# Patient Record
Sex: FEMALE | Race: WHITE | NOT HISPANIC OR LATINO | URBAN - METROPOLITAN AREA
[De-identification: names, ages, dates, MRNs, and addresses within clinical notes are randomized per-mention and may not be internally consistent; named-entity substitution may affect disease eponyms.]

---

## 2017-01-23 ENCOUNTER — OUTPATIENT (OUTPATIENT)
Dept: OUTPATIENT SERVICES | Facility: HOSPITAL | Age: 72
LOS: 1 days | End: 2017-01-23
Payer: MEDICARE

## 2017-01-23 DIAGNOSIS — Z00.8 ENCOUNTER FOR OTHER GENERAL EXAMINATION: ICD-10-CM

## 2017-01-23 PROCEDURE — 71020: CPT | Mod: 26

## 2017-07-20 ENCOUNTER — APPOINTMENT (OUTPATIENT)
Dept: OBGYN | Facility: CLINIC | Age: 72
End: 2017-07-20

## 2017-07-20 VITALS
DIASTOLIC BLOOD PRESSURE: 77 MMHG | HEIGHT: 64 IN | WEIGHT: 186 LBS | BODY MASS INDEX: 31.76 KG/M2 | SYSTOLIC BLOOD PRESSURE: 130 MMHG

## 2017-07-20 RX ORDER — NITROFURANTOIN (MONOHYDRATE/MACROCRYSTALS) 25; 75 MG/1; MG/1
100 CAPSULE ORAL
Refills: 0 | Status: COMPLETED | COMMUNITY

## 2017-07-24 LAB — HPV HIGH+LOW RISK DNA PNL CVX: NEGATIVE

## 2017-07-25 LAB — CYTOLOGY CVX/VAG DOC THIN PREP: NORMAL

## 2017-09-15 ENCOUNTER — NON-APPOINTMENT (OUTPATIENT)
Age: 72
End: 2017-09-15

## 2017-09-15 ENCOUNTER — APPOINTMENT (OUTPATIENT)
Dept: CARDIOLOGY | Facility: CLINIC | Age: 72
End: 2017-09-15
Payer: MEDICARE

## 2017-09-15 VITALS
BODY MASS INDEX: 31.93 KG/M2 | RESPIRATION RATE: 16 BRPM | DIASTOLIC BLOOD PRESSURE: 78 MMHG | WEIGHT: 186 LBS | OXYGEN SATURATION: 94 % | HEART RATE: 53 BPM | SYSTOLIC BLOOD PRESSURE: 141 MMHG

## 2017-09-15 DIAGNOSIS — Z83.79 FAMILY HISTORY OF OTHER DISEASES OF THE DIGESTIVE SYSTEM: ICD-10-CM

## 2017-09-15 DIAGNOSIS — E03.9 HYPOTHYROIDISM, UNSPECIFIED: ICD-10-CM

## 2017-09-15 DIAGNOSIS — Z78.9 OTHER SPECIFIED HEALTH STATUS: ICD-10-CM

## 2017-09-15 DIAGNOSIS — Z86.39 PERSONAL HISTORY OF OTHER ENDOCRINE, NUTRITIONAL AND METABOLIC DISEASE: ICD-10-CM

## 2017-09-15 DIAGNOSIS — G43.909 MIGRAINE, UNSPECIFIED, NOT INTRACTABLE, W/OUT STATUS MIGRAINOSUS: ICD-10-CM

## 2017-09-15 DIAGNOSIS — Z82.49 FAMILY HISTORY OF ISCHEMIC HEART DISEASE AND OTHER DISEASES OF THE CIRCULATORY SYSTEM: ICD-10-CM

## 2017-09-15 DIAGNOSIS — Z86.69 PERSONAL HISTORY OF OTHER DISEASES OF THE NERVOUS SYSTEM AND SENSE ORGANS: ICD-10-CM

## 2017-09-15 DIAGNOSIS — Z87.440 PERSONAL HISTORY OF URINARY (TRACT) INFECTIONS: ICD-10-CM

## 2017-09-15 PROCEDURE — 99205 OFFICE O/P NEW HI 60 MIN: CPT

## 2017-09-15 PROCEDURE — 93000 ELECTROCARDIOGRAM COMPLETE: CPT

## 2017-09-16 PROBLEM — G43.909 MIGRAINE HEADACHE: Status: ACTIVE | Noted: 2017-09-16

## 2017-09-16 PROBLEM — Z83.79 FAMILY HISTORY OF HEPATIC CIRRHOSIS: Status: ACTIVE | Noted: 2017-09-16

## 2017-09-16 PROBLEM — E03.9 HYPOTHYROIDISM: Status: ACTIVE | Noted: 2017-09-16

## 2017-09-16 PROBLEM — Z86.69 HISTORY OF MIGRAINE HEADACHES: Status: RESOLVED | Noted: 2017-09-16 | Resolved: 2017-09-16

## 2017-09-16 PROBLEM — Z78.9 DOES NOT USE ILLICIT DRUGS: Status: ACTIVE | Noted: 2017-09-16

## 2017-09-16 PROBLEM — Z82.49 FAMILY HISTORY OF ACUTE MYOCARDIAL INFARCTION: Status: ACTIVE | Noted: 2017-09-16

## 2017-09-16 PROBLEM — Z87.440 HISTORY OF FREQUENT URINARY TRACT INFECTIONS: Status: RESOLVED | Noted: 2017-09-16 | Resolved: 2017-09-16

## 2017-09-16 PROBLEM — Z86.39 HISTORY OF HYPOTHYROIDISM: Status: RESOLVED | Noted: 2017-09-16 | Resolved: 2017-09-16

## 2017-09-27 ENCOUNTER — OUTPATIENT (OUTPATIENT)
Dept: OUTPATIENT SERVICES | Facility: HOSPITAL | Age: 72
LOS: 1 days | End: 2017-09-27
Payer: MEDICARE

## 2017-09-27 ENCOUNTER — APPOINTMENT (OUTPATIENT)
Dept: CV DIAGNOSITCS | Facility: HOSPITAL | Age: 72
End: 2017-09-27

## 2017-09-27 DIAGNOSIS — R55 SYNCOPE AND COLLAPSE: ICD-10-CM

## 2017-09-27 PROCEDURE — 93227 XTRNL ECG REC<48 HR R&I: CPT

## 2017-09-27 PROCEDURE — 93306 TTE W/DOPPLER COMPLETE: CPT | Mod: 26

## 2017-11-10 ENCOUNTER — APPOINTMENT (OUTPATIENT)
Dept: CARDIOLOGY | Facility: CLINIC | Age: 72
End: 2017-11-10
Payer: MEDICARE

## 2017-11-10 ENCOUNTER — NON-APPOINTMENT (OUTPATIENT)
Age: 72
End: 2017-11-10

## 2017-11-10 VITALS
BODY MASS INDEX: 31.58 KG/M2 | HEART RATE: 51 BPM | HEIGHT: 64 IN | SYSTOLIC BLOOD PRESSURE: 119 MMHG | DIASTOLIC BLOOD PRESSURE: 86 MMHG | OXYGEN SATURATION: 97 % | WEIGHT: 185 LBS | RESPIRATION RATE: 16 BRPM

## 2017-11-10 PROCEDURE — 93000 ELECTROCARDIOGRAM COMPLETE: CPT

## 2017-11-10 PROCEDURE — 99215 OFFICE O/P EST HI 40 MIN: CPT

## 2017-11-12 RX ORDER — FOLIC ACID 1 MG/1
1 TABLET ORAL
Qty: 90 | Refills: 0 | Status: ACTIVE | COMMUNITY
Start: 2017-09-19

## 2018-05-25 ENCOUNTER — NON-APPOINTMENT (OUTPATIENT)
Age: 73
End: 2018-05-25

## 2018-05-25 ENCOUNTER — APPOINTMENT (OUTPATIENT)
Dept: CARDIOLOGY | Facility: CLINIC | Age: 73
End: 2018-05-25
Payer: MEDICARE

## 2018-05-25 VITALS
HEART RATE: 48 BPM | OXYGEN SATURATION: 97 % | DIASTOLIC BLOOD PRESSURE: 79 MMHG | BODY MASS INDEX: 31.41 KG/M2 | RESPIRATION RATE: 16 BRPM | HEIGHT: 64 IN | SYSTOLIC BLOOD PRESSURE: 113 MMHG | WEIGHT: 184 LBS | TEMPERATURE: 97.9 F

## 2018-05-25 DIAGNOSIS — Z86.79 PERSONAL HISTORY OF OTHER DISEASES OF THE CIRCULATORY SYSTEM: ICD-10-CM

## 2018-05-25 PROCEDURE — 99215 OFFICE O/P EST HI 40 MIN: CPT

## 2018-05-25 PROCEDURE — 93000 ELECTROCARDIOGRAM COMPLETE: CPT

## 2018-05-27 PROBLEM — Z86.79 HISTORY OF SINUS BRADYCARDIA: Status: RESOLVED | Noted: 2018-05-27 | Resolved: 2018-05-27

## 2018-06-05 ENCOUNTER — APPOINTMENT (OUTPATIENT)
Dept: OBGYN | Facility: CLINIC | Age: 73
End: 2018-06-05
Payer: MEDICARE

## 2018-06-05 VITALS — DIASTOLIC BLOOD PRESSURE: 92 MMHG | SYSTOLIC BLOOD PRESSURE: 147 MMHG | BODY MASS INDEX: 31.58 KG/M2 | WEIGHT: 184 LBS

## 2018-06-05 DIAGNOSIS — N64.4 MASTODYNIA: ICD-10-CM

## 2018-06-05 DIAGNOSIS — N64.9 DISORDER OF BREAST, UNSPECIFIED: ICD-10-CM

## 2018-06-05 PROCEDURE — 99213 OFFICE O/P EST LOW 20 MIN: CPT

## 2018-07-03 ENCOUNTER — OTHER (OUTPATIENT)
Age: 73
End: 2018-07-03

## 2018-07-03 ENCOUNTER — APPOINTMENT (OUTPATIENT)
Dept: SURGERY | Facility: CLINIC | Age: 73
End: 2018-07-03
Payer: MEDICARE

## 2018-07-03 VITALS
WEIGHT: 183 LBS | OXYGEN SATURATION: 96 % | SYSTOLIC BLOOD PRESSURE: 163 MMHG | TEMPERATURE: 97.8 F | DIASTOLIC BLOOD PRESSURE: 90 MMHG | HEART RATE: 63 BPM | HEIGHT: 64 IN | BODY MASS INDEX: 31.24 KG/M2

## 2018-07-03 PROCEDURE — 99204 OFFICE O/P NEW MOD 45 MIN: CPT

## 2018-07-03 RX ORDER — ESTRADIOL 0.1 MG/G
0.1 CREAM VAGINAL
Qty: 42 | Refills: 0 | Status: DISCONTINUED | COMMUNITY
Start: 2017-09-25 | End: 2018-07-03

## 2018-07-13 ENCOUNTER — APPOINTMENT (OUTPATIENT)
Dept: CARDIOLOGY | Facility: CLINIC | Age: 73
End: 2018-07-13
Payer: MEDICARE

## 2018-07-13 ENCOUNTER — NON-APPOINTMENT (OUTPATIENT)
Age: 73
End: 2018-07-13

## 2018-07-13 VITALS
WEIGHT: 178 LBS | OXYGEN SATURATION: 96 % | HEIGHT: 64 IN | TEMPERATURE: 97.8 F | HEART RATE: 94 BPM | SYSTOLIC BLOOD PRESSURE: 112 MMHG | DIASTOLIC BLOOD PRESSURE: 74 MMHG | BODY MASS INDEX: 30.39 KG/M2 | RESPIRATION RATE: 16 BRPM

## 2018-07-13 PROCEDURE — 99215 OFFICE O/P EST HI 40 MIN: CPT

## 2018-07-13 PROCEDURE — 93000 ELECTROCARDIOGRAM COMPLETE: CPT

## 2018-07-17 ENCOUNTER — OUTPATIENT (OUTPATIENT)
Dept: OUTPATIENT SERVICES | Facility: HOSPITAL | Age: 73
LOS: 1 days | End: 2018-07-17
Payer: MEDICARE

## 2018-07-17 ENCOUNTER — APPOINTMENT (OUTPATIENT)
Dept: MRI IMAGING | Facility: CLINIC | Age: 73
End: 2018-07-17
Payer: MEDICARE

## 2018-07-17 DIAGNOSIS — N64.52 NIPPLE DISCHARGE: ICD-10-CM

## 2018-07-17 PROCEDURE — C8908: CPT

## 2018-07-17 PROCEDURE — C8937: CPT

## 2018-07-17 PROCEDURE — A9585: CPT

## 2018-07-17 PROCEDURE — 82565 ASSAY OF CREATININE: CPT

## 2018-07-17 PROCEDURE — 0159T: CPT | Mod: 26

## 2018-07-17 PROCEDURE — 77059 MRI BREAST BILATERAL: CPT | Mod: 26

## 2018-07-24 ENCOUNTER — APPOINTMENT (OUTPATIENT)
Dept: OBGYN | Facility: CLINIC | Age: 73
End: 2018-07-24
Payer: MEDICARE

## 2018-07-24 VITALS — WEIGHT: 182 LBS | DIASTOLIC BLOOD PRESSURE: 100 MMHG | BODY MASS INDEX: 31.24 KG/M2 | SYSTOLIC BLOOD PRESSURE: 150 MMHG

## 2018-07-24 PROCEDURE — 99397 PER PM REEVAL EST PAT 65+ YR: CPT

## 2018-07-24 PROCEDURE — 82270 OCCULT BLOOD FECES: CPT

## 2018-07-26 LAB — HPV HIGH+LOW RISK DNA PNL CVX: NOT DETECTED

## 2018-07-30 LAB — CYTOLOGY CVX/VAG DOC THIN PREP: NORMAL

## 2018-08-17 ENCOUNTER — NON-APPOINTMENT (OUTPATIENT)
Age: 73
End: 2018-08-17

## 2018-08-17 ENCOUNTER — APPOINTMENT (OUTPATIENT)
Dept: CARDIOLOGY | Facility: CLINIC | Age: 73
End: 2018-08-17
Payer: MEDICARE

## 2018-08-17 VITALS
RESPIRATION RATE: 16 BRPM | BODY MASS INDEX: 30.9 KG/M2 | OXYGEN SATURATION: 97 % | HEIGHT: 64 IN | WEIGHT: 181 LBS | TEMPERATURE: 97.8 F | HEART RATE: 52 BPM | DIASTOLIC BLOOD PRESSURE: 75 MMHG | SYSTOLIC BLOOD PRESSURE: 113 MMHG

## 2018-08-17 PROCEDURE — 93000 ELECTROCARDIOGRAM COMPLETE: CPT

## 2018-08-17 PROCEDURE — 99215 OFFICE O/P EST HI 40 MIN: CPT

## 2018-09-18 ENCOUNTER — RX RENEWAL (OUTPATIENT)
Age: 73
End: 2018-09-18

## 2018-10-15 ENCOUNTER — APPOINTMENT (OUTPATIENT)
Dept: SURGERY | Facility: CLINIC | Age: 73
End: 2018-10-15
Payer: MEDICARE

## 2018-10-15 VITALS
TEMPERATURE: 98.3 F | SYSTOLIC BLOOD PRESSURE: 152 MMHG | OXYGEN SATURATION: 95 % | HEIGHT: 64 IN | BODY MASS INDEX: 31.41 KG/M2 | WEIGHT: 184 LBS | DIASTOLIC BLOOD PRESSURE: 84 MMHG | HEART RATE: 52 BPM

## 2018-10-15 DIAGNOSIS — Z00.00 ENCOUNTER FOR GENERAL ADULT MEDICAL EXAMINATION W/OUT ABNORMAL FINDINGS: ICD-10-CM

## 2018-10-15 PROCEDURE — 99214 OFFICE O/P EST MOD 30 MIN: CPT

## 2018-11-16 ENCOUNTER — OUTPATIENT (OUTPATIENT)
Dept: OUTPATIENT SERVICES | Facility: HOSPITAL | Age: 73
LOS: 1 days | End: 2018-11-16

## 2018-11-16 ENCOUNTER — APPOINTMENT (OUTPATIENT)
Dept: CV DIAGNOSITCS | Facility: HOSPITAL | Age: 73
End: 2018-11-16
Payer: MEDICARE

## 2018-11-16 ENCOUNTER — NON-APPOINTMENT (OUTPATIENT)
Age: 73
End: 2018-11-16

## 2018-11-16 ENCOUNTER — APPOINTMENT (OUTPATIENT)
Dept: CARDIOLOGY | Facility: CLINIC | Age: 73
End: 2018-11-16
Payer: MEDICARE

## 2018-11-16 VITALS
HEART RATE: 52 BPM | WEIGHT: 182 LBS | SYSTOLIC BLOOD PRESSURE: 108 MMHG | HEIGHT: 64 IN | OXYGEN SATURATION: 96 % | BODY MASS INDEX: 31.07 KG/M2 | DIASTOLIC BLOOD PRESSURE: 74 MMHG

## 2018-11-16 DIAGNOSIS — I48.91 UNSPECIFIED ATRIAL FIBRILLATION: ICD-10-CM

## 2018-11-16 PROCEDURE — 93000 ELECTROCARDIOGRAM COMPLETE: CPT

## 2018-11-16 PROCEDURE — 99215 OFFICE O/P EST HI 40 MIN: CPT

## 2018-11-16 PROCEDURE — 93306 TTE W/DOPPLER COMPLETE: CPT | Mod: 26

## 2018-11-23 NOTE — PHYSICAL EXAM
[General Appearance - Well Developed] : well developed [General Appearance - Well Nourished] : well nourished [Normal Conjunctiva] : the conjunctiva exhibited no abnormalities [Eyelids - No Xanthelasma] : the eyelids demonstrated no xanthelasmas [Normal Oral Mucosa] : normal oral mucosa [No Oral Cyanosis] : no oral cyanosis [Normal Jugular Venous A Waves Present] : normal jugular venous A waves present [Normal Jugular Venous V Waves Present] : normal jugular venous V waves present [] : no respiratory distress [Respiration, Rhythm And Depth] : normal respiratory rhythm and effort [Auscultation Breath Sounds / Voice Sounds] : lungs were clear to auscultation bilaterally [Heart Sounds] : normal S1 and S2 [Murmurs] : no murmurs present [Edema] : no peripheral edema present [Bowel Sounds] : normal bowel sounds [Abdomen Soft] : soft [Abdomen Tenderness] : non-tender [Abnormal Walk] : normal gait [Nail Clubbing] : no clubbing of the fingernails [Cyanosis, Localized] : no localized cyanosis [Skin Color & Pigmentation] : normal skin color and pigmentation [Oriented To Time, Place, And Person] : oriented to person, place, and time

## 2018-11-23 NOTE — HISTORY OF PRESENT ILLNESS
[FreeTextEntry1] : Ms. Gonsalez presents to the office today for a follow up visit.\par \par Ms. Gonsalez is a 73 year old female with a medical history significant for hypertension, hyperlipidemia, recently diagnosed atrial fibrillation, hypothyroidism, migraine headaches, and frequent urinary tract infections.  In addition, Ms. Gonsalez has previously undergone an appendectomy, repair of a deviated nasal septum, a right ovarian cystectomy, a pelvic sling procedure, and a tubal ligation.\par \par In November 2017, Ms. Gonsalez had experienced a brief episode of syncope while sitting in a chair.  Following that event, I had referred her for a transthoracic echocardiogram in addition to a Holter monitor.  In summary, the transthoracic echocardiogram demonstrated normal LV and RV systolic function, with no regional wall motion abnormalities.  There was mild-moderate mitral regurgitation as well as mild aortic regurgitation.  There was evidence of mild pulmonary hypertension, with an estimated pulmonary artery systolic pressure of 45 mmHg.  Ms. Gonsalez' Holter monitor demonstrated sinus rhythm with heart rates between 40/minute and 97/minute.  Rare premature ventricular complexes were noted.  In addition, there were occasional premature supraventricular complexes.  There were two very brief (3 beats) runs of SVT, with the fastest being at 138/minute.\par \par At the time of a recent office visit (May 25th, 2018), Ms. Gonsalez was found to have a resting heart rate of 42/minute on ECG.  In light of the fact that she had experienced recent episodes of lightheadedness, we had discontinued her metoprolol as this may have been contributing to the bradycardia.\par \par Recently, Ms. Gonsalez has experienced bilateral bloody discharge from both breast nipples (left more than right) in addition to bilateral breast pain.  She underwent a mammogram on June 8th, 2018.  In summary, this demonstrated fibroglandular breasts with no radiographic signs of malignancy.  A follow up breast ultrasound (also performed on June 8th, 2018) demonstrated retroareolar ductal prominence but was otherwise unremarkable.  No masses were noted.  Ms. Gonsalez had a breast MRI performed on July 17th, 2018 for further evaluation and this demonstrated no evidence of breast malignancy.\par \par At the time of a recent office visit (July 13th, 2018), Ms. Gonsalez was found to be in atrial fibrillation with a somewhat rapid ventricular response (/minute).  This correlated with her recent symptoms of palpitations and a pounding sensation in her chest.  Because of the slightly rapid ventricular response to the atrial fibrillation, Ms. Gonsalez was started back on beta blocker therapy (with metoprolol tartrate 25 mg BID).  Ms. Gonsalez declined to be started on anticoagulation therapy and she was started on aspirin instead. \par \par Ms. Gonsalez reports that she has been feeling well since her last office visit.  She has been able to ambulate without difficulty and denies having any chest pain or dyspnea either at rest or with exertion.  Ms. Gonsalez continues to experience occasional palpitations and/or a pounding sensation in her chest.  However, she estimates that these episodes occur approximately once per week or less.  The palpitations often occur while Ms. Gonsalez is lying down in bed.  There has been no history of presyncope or syncope.  In addition, Ms. Gonsalez denies having any orthopnea, paroxysmal nocturnal dyspnea, or edema.

## 2018-11-23 NOTE — DISCUSSION/SUMMARY
[FreeTextEntry1] : Of note is that Ms. Gonsalez had a follow up transthoracic echocardiogram performed at the time of today's office visit.  In summary, this demonstrated normal LV and RV systolic function, with no regional wall motion abnormalities (estimated LVEF 67%).  There was mild-to-moderate mitral regurgitation in addition to mild aortic regurgitation.  In summary, there were no significant changes compared to Ms. Gonsalez' previous study of 9/27/2017.\par \par In summary, Ms. Gonsalez appears to be doing well from a cardiac standpoint.  She continues to maintain a good functional capacity and denies having any symptoms suggestive of angina or congestive heart failure.  In addition, Ms. Caballero blood pressure is under excellent control on her current medical regimen.  Today's ECG demonstrates that Ms. Gonsalez remains in sinus rhythm.  I discussed the findings of today's echocardiogram with Ms. Gonsalez and indicated that it demonstrated normal LV and RV systolic function in addition to unchanged mitral and aortic regurgitation.\par \par I encouraged Ms. Gonsalez to continue to ambulate as tolerated.  I also advised her to continue with her current medical regimen.  We will continue Ms. Gonsalez' beta blocker therapy for now, as she appears to be asymptomatic from her resting bradycardia.  Ms. Gonsalez will follow up with me in approximately three months.

## 2018-11-30 ENCOUNTER — RX RENEWAL (OUTPATIENT)
Age: 73
End: 2018-11-30

## 2019-01-22 ENCOUNTER — RX RENEWAL (OUTPATIENT)
Age: 74
End: 2019-01-22

## 2019-03-15 ENCOUNTER — APPOINTMENT (OUTPATIENT)
Dept: CARDIOLOGY | Facility: CLINIC | Age: 74
End: 2019-03-15
Payer: MEDICARE

## 2019-03-15 ENCOUNTER — NON-APPOINTMENT (OUTPATIENT)
Age: 74
End: 2019-03-15

## 2019-03-15 VITALS
RESPIRATION RATE: 16 BRPM | BODY MASS INDEX: 30.73 KG/M2 | HEART RATE: 49 BPM | OXYGEN SATURATION: 98 % | SYSTOLIC BLOOD PRESSURE: 129 MMHG | WEIGHT: 180 LBS | HEIGHT: 64 IN | DIASTOLIC BLOOD PRESSURE: 79 MMHG

## 2019-03-15 DIAGNOSIS — R55 SYNCOPE AND COLLAPSE: ICD-10-CM

## 2019-03-15 PROCEDURE — 99214 OFFICE O/P EST MOD 30 MIN: CPT

## 2019-03-15 PROCEDURE — 93000 ELECTROCARDIOGRAM COMPLETE: CPT

## 2019-03-17 PROBLEM — R55 SYNCOPE: Status: ACTIVE | Noted: 2017-09-15

## 2019-03-17 NOTE — DISCUSSION/SUMMARY
[FreeTextEntry1] : In summary, Ms. Gonsalez appears to be doing well from a cardiac standpoint.  She continues to maintain a good functional capacity and has not had any symptoms suggestive of angina or congestive heart failure.  In addition, her blood pressure is under excellent control on her current medical regimen.  Ms. Gonsalez remains in sinus rhythm on today's ECG with no clinically obvious recent recurrences of atrial fibrillation.\par \par I encouraged Ms. Gonsalez to continue to exercise as tolerated.  I also advised her to continue with her current medical regimen.  She will follow up with me in approximately six months, at which time we will obtain a follow up transthoracic echocardiogram.

## 2019-03-17 NOTE — HISTORY OF PRESENT ILLNESS
[FreeTextEntry1] : Ms. Gonsalez presents to the office today for a follow up visit.\par \par Ms. Gonsalez is a 73 year old female with a medical history significant for hypertension, hyperlipidemia, recently diagnosed atrial fibrillation, hypothyroidism, migraine headaches, and frequent urinary tract infections.  In addition, Ms. Gonsalez has previously undergone an appendectomy, repair of a deviated nasal septum, a right ovarian cystectomy, a pelvic sling procedure, and a tubal ligation.\par \par In November 2017, Ms. Gonsalez had experienced a brief episode of syncope while sitting in a chair.  Following that event, I had referred her for a transthoracic echocardiogram in addition to a Holter monitor.  In summary, the transthoracic echocardiogram demonstrated normal LV and RV systolic function, with no regional wall motion abnormalities.  There was mild-moderate mitral regurgitation as well as mild aortic regurgitation.  There was evidence of mild pulmonary hypertension, with an estimated pulmonary artery systolic pressure of 45 mmHg.  Ms. Gonsalez' Holter monitor demonstrated sinus rhythm with heart rates between 40/minute and 97/minute.  Rare premature ventricular complexes were noted.  In addition, there were occasional premature supraventricular complexes.  There were two very brief (3 beats) runs of SVT, with the fastest being at 138/minute.\par \par At the time of a recent office visit (May 25th, 2018), Ms. Gonsalez was found to have a resting heart rate of 42/minute on ECG.  In light of the fact that she had experienced recent episodes of lightheadedness, we had discontinued her metoprolol as this may have been contributing to the bradycardia.\par \par Recently, Ms. Gonsalez has experienced bilateral bloody discharge from both breast nipples (left more than right) in addition to bilateral breast pain.  She underwent a mammogram on June 8th, 2018.  In summary, this demonstrated fibroglandular breasts with no radiographic signs of malignancy.  A follow up breast ultrasound (also performed on June 8th, 2018) demonstrated retroareolar ductal prominence but was otherwise unremarkable.  No masses were noted.  Ms. Gonsalez had a breast MRI performed on July 17th, 2018 for further evaluation and this demonstrated no evidence of breast malignancy.\par \par At the time of a recent office visit (July 13th, 2018), Ms. Gonsalez was found to be in atrial fibrillation with a somewhat rapid ventricular response (/minute).  This correlated with her recent symptoms of palpitations and a pounding sensation in her chest.  Because of the slightly rapid ventricular response to the atrial fibrillation, Ms. Gonsalez was started back on beta blocker therapy (with metoprolol tartrate 25 mg BID).  Ms. Gonsalez declined to be started on anticoagulation therapy and she was started on aspirin instead. \par \par Ms. Gonsalez reports that she has been feeling well since her last office visit.  She continues to ambulate without difficulty and denies having any chest pain or dyspnea either at rest or with exertion.  In addition, there has been no history of palpitations, presyncope, or syncope.  Ms. Gonsalez denies having any orthopnea, paroxysmal nocturnal dyspnea, or edema.  Ms. Gonsalez has been measuring her blood pressure at home and maintains a  blood pressure diary.  In summary, the systolic blood pressure range has been in the 120's-130's mmHg and the diastolic blood pressure range in the 60's-70's mmHg.

## 2019-03-17 NOTE — PHYSICAL EXAM
[General Appearance - Well Nourished] : well nourished [General Appearance - Well Developed] : well developed [Eyelids - No Xanthelasma] : the eyelids demonstrated no xanthelasmas [Normal Conjunctiva] : the conjunctiva exhibited no abnormalities [No Oral Cyanosis] : no oral cyanosis [Normal Oral Mucosa] : normal oral mucosa [Normal Jugular Venous A Waves Present] : normal jugular venous A waves present [Normal Jugular Venous V Waves Present] : normal jugular venous V waves present [] : no respiratory distress [Respiration, Rhythm And Depth] : normal respiratory rhythm and effort [Auscultation Breath Sounds / Voice Sounds] : lungs were clear to auscultation bilaterally [Murmurs] : no murmurs present [Heart Sounds] : normal S1 and S2 [Bowel Sounds] : normal bowel sounds [Edema] : no peripheral edema present [Abdomen Soft] : soft [Abdomen Tenderness] : non-tender [Abnormal Walk] : normal gait [Nail Clubbing] : no clubbing of the fingernails [Skin Color & Pigmentation] : normal skin color and pigmentation [Cyanosis, Localized] : no localized cyanosis [Oriented To Time, Place, And Person] : oriented to person, place, and time

## 2019-04-01 ENCOUNTER — RX RENEWAL (OUTPATIENT)
Age: 74
End: 2019-04-01

## 2019-04-15 ENCOUNTER — APPOINTMENT (OUTPATIENT)
Dept: BREAST CENTER | Facility: CLINIC | Age: 74
End: 2019-04-15
Payer: MEDICARE

## 2019-04-15 ENCOUNTER — APPOINTMENT (OUTPATIENT)
Dept: SURGERY | Facility: CLINIC | Age: 74
End: 2019-04-15

## 2019-04-15 VITALS
HEIGHT: 64 IN | WEIGHT: 185.31 LBS | DIASTOLIC BLOOD PRESSURE: 77 MMHG | HEART RATE: 51 BPM | SYSTOLIC BLOOD PRESSURE: 126 MMHG | BODY MASS INDEX: 31.64 KG/M2

## 2019-04-15 PROCEDURE — 99214 OFFICE O/P EST MOD 30 MIN: CPT

## 2019-04-15 NOTE — PAST MEDICAL HISTORY
[Postmenopausal] : The patient is postmenopausal [Menopause Age____] : age at menopause was [unfilled] [Menarche Age ____] : age at menarche was [unfilled] [Total Preg ___] : G[unfilled] [Live Births ___] : P[unfilled]  [Age At Live Birth ___] : Age at live birth: [unfilled]

## 2019-04-15 NOTE — ASSESSMENT
[FreeTextEntry1] : 73 yo female presents for clinical evaluation secondary to history of spontaneous bloody nipple discharge (patient was on Estrace cream for chronic UTI , began taking 9/25/2017 until 5/2018).  Recent imaging and clinical breast exam is benign.  Recommendation for follow up visit in 6 months for clinical breast exam and continue annual screening mammogram and sonogram.\par 1.  Continue annual screening mammogram and sonogram due 6/2019\par 2. Follow up exam in 6 months 10/2019 for clinical breast exam

## 2019-04-15 NOTE — HISTORY OF PRESENT ILLNESS
[FreeTextEntry1] : Referring Physician:  Dr. Ebony You\par I had the pleasure of seeing PARRIS HUTSON  in the office today for a follow up breast exam s/p history with bloody nipple discharge.\par \par Parris is a dominik 73 yo female who is in overall good health.  She states she was put on Estrace cream for her chronic urinary tract infection in 2017.  She developed spontaneous bloody nipple discharge in May of 2018 and saw her urologist who then discontinued the Estrace cream and she has not had any problems since.  She underwent diagnostic mammogram/sonogram which both came back benign.  She is here today for clinical evaluation.\par \par She denies dominant breast mass, skin changes or nipple discharge. She denies headaches, blurry vision, chest pain, SOB, abdominal pain, joint aches or difficult walking.\par \par  with 1st delivery at 23.  Menses began at age 12.\par She denies personal history of malignancy.\par She denies family history.\par \par Imaging:  [facility]\par 2018:  BILATERAL DIAGNOSTIC MAMMOGRAM:  Minimally fibroglandular breast with no radiographic signs of malignancy,  When compared with prior mammographic examination, there is no interval change.  In view of the palpable abnormality on the left breast ultrasound is scheduled for additional evaluation.  BIRADS 0 Incomplete\par \par 2018  BILATERAL DIAGNOSTIC BREAST ULTRASOUND:  Mild retroareolar ductal prominence.  Otherwise unremarkable bilateral breast sonogram.  No masses noted.  If a discrete mass is palpable, further evaluation must be made based on clinical grounds alone.  BIRADS 2 Benign findings.\par \par 2018  MR BREAST WAWIC BI W CAD:\par Impression:  No MRI evidence of malignancy.  BIRADS 2 Benign findings.\par \par We reviewed clinical exam.  Recent imaging is benign.   We had discussed MRI results via mobile in 2018.  She reports she has not had any nipple bloody discharge since she stopped taking estrace cream.  Her clinical breast exam is benign and she inverted nipples that nataliya when stimulated.  Recommendation to continue annual screening mammogram and sonogram in 2019 and follow up clinical breast exam in 2019.  She understands and agrees to plan.  All questions answered.

## 2019-04-15 NOTE — PHYSICAL EXAM
[Normocephalic] : normocephalic [Atraumatic] : atraumatic [EOMI] : extra ocular movement intact [PERRL] : pupils equal, round and reactive to light [Sclera nonicteric] : sclera nonicteric [Supple] : supple [No Supraclavicular Adenopathy] : no supraclavicular adenopathy [Examined in the supine and seated position] : examined in the supine and seated position [No dominant masses] : no dominant masses in right breast  [No dominant masses] : no dominant masses left breast [No Nipple Discharge] : no right nipple discharge [No Nipple Retraction] : no left nipple retraction [Breast Abnormal Secretion Bloody Fluid Right] : no bloody discharge [Breast Nipple Inversion] : nipples inverted [Breast Abnormal Secretion Bloody Fluid Left] : no bloody discharge [Breast Nipple Retraction] : nipples not retracted [Breast Nipple Flattening] : nipples not flattened [Breast Nipple Fissures] : nipples not fissured [Breast Abnormal Secretion Serous Fluid] : no serous discharge [Breast Abnormal Lactation (Galactorrhea)] : no galactorrhea [Breast Abnormal Secretion Opalescent Fluid] : no milky discharge [No Axillary Lymphadenopathy] : no left axillary lymphadenopathy [No Rashes] : no rashes [No Edema] : no edema [de-identified] : No supraclavicular or axillary adenopathy. No dominant masses, normal to palpation. Inverted nipple without discharge. No skin changes.\par  [No Ulceration] : no ulceration [de-identified] : No supraclavicular or axillary adenopathy. No dominant masses, normal to palpation. inverted nipple. No skin changes.\par

## 2019-05-12 ENCOUNTER — RX RENEWAL (OUTPATIENT)
Age: 74
End: 2019-05-12

## 2019-07-30 ENCOUNTER — RX RENEWAL (OUTPATIENT)
Age: 74
End: 2019-07-30

## 2019-09-16 ENCOUNTER — RX RENEWAL (OUTPATIENT)
Age: 74
End: 2019-09-16

## 2019-10-14 ENCOUNTER — APPOINTMENT (OUTPATIENT)
Dept: SURGERY | Facility: CLINIC | Age: 74
End: 2019-10-14

## 2019-10-18 ENCOUNTER — NON-APPOINTMENT (OUTPATIENT)
Age: 74
End: 2019-10-18

## 2019-10-18 ENCOUNTER — APPOINTMENT (OUTPATIENT)
Dept: CARDIOLOGY | Facility: CLINIC | Age: 74
End: 2019-10-18
Payer: MEDICARE

## 2019-10-18 VITALS
DIASTOLIC BLOOD PRESSURE: 71 MMHG | WEIGHT: 192 LBS | SYSTOLIC BLOOD PRESSURE: 106 MMHG | HEART RATE: 71 BPM | BODY MASS INDEX: 32.78 KG/M2 | HEIGHT: 64 IN | OXYGEN SATURATION: 96 %

## 2019-10-18 PROCEDURE — 99214 OFFICE O/P EST MOD 30 MIN: CPT

## 2019-10-18 PROCEDURE — 93000 ELECTROCARDIOGRAM COMPLETE: CPT

## 2019-10-20 NOTE — DISCUSSION/SUMMARY
[FreeTextEntry1] : In summary, today's 12-lead electrocardiogram demonstrates that Ms. Gonsalez has returned to being in atrial fibrillation.  It appears that she is minimally symptomatic from the arrhythmia and is not aware of when she may have returned to being in atrial fibrillation.  The ventricular rate control appears to be adequate on her current dose of metoprolol.\par \par We again had a discussion regarding the concept of being on anticoagulation therapy for thromboembolic prophylaxis in the setting of atrial fibrillation.  In particular, Ms. Gonsalez has a [id number] score of 3 (one point each for hypertension, age 65-74 years, and female gender).  As such, it would be advisable for Ms. Gonsalez to be treated with therapeutic anticoagulation for thromboembolic prophylaxis.  However, Ms. Gonsalez continues to decline anticoagulation therapy despite a discussion regarding the risk of stroke.  Therefore, Ms. Gonsalez will continue taking aspirin and will decline oral anticoagulation therapy.\par \par Ms. Gonsalez will follow up with me in approximately three months, at which time we will obtain a follow up transthoracic echocardiogram, as it will have been more than a year since her last study.

## 2019-10-20 NOTE — PHYSICAL EXAM
[Eyelids - No Xanthelasma] : the eyelids demonstrated no xanthelasmas [General Appearance - Well Developed] : well developed [Normal Conjunctiva] : the conjunctiva exhibited no abnormalities [General Appearance - Well Nourished] : well nourished [Normal Jugular Venous A Waves Present] : normal jugular venous A waves present [Normal Oral Mucosa] : normal oral mucosa [No Oral Cyanosis] : no oral cyanosis [Normal Jugular Venous V Waves Present] : normal jugular venous V waves present [] : no respiratory distress [Respiration, Rhythm And Depth] : normal respiratory rhythm and effort [Heart Sounds] : normal S1 and S2 [Auscultation Breath Sounds / Voice Sounds] : lungs were clear to auscultation bilaterally [Murmurs] : no murmurs present [Bowel Sounds] : normal bowel sounds [Edema] : no peripheral edema present [Abdomen Soft] : soft [Abdomen Tenderness] : non-tender [Nail Clubbing] : no clubbing of the fingernails [Abnormal Walk] : normal gait [Skin Color & Pigmentation] : normal skin color and pigmentation [Oriented To Time, Place, And Person] : oriented to person, place, and time [Cyanosis, Localized] : no localized cyanosis

## 2019-10-20 NOTE — HISTORY OF PRESENT ILLNESS
[FreeTextEntry1] : Ms. Gonsalez presents to the office today for a follow up visit.\par \par Ms. Gonsalez is a 73 year old female with a medical history significant for hypertension, hyperlipidemia, recently diagnosed atrial fibrillation, hypothyroidism, migraine headaches, and frequent urinary tract infections.  In addition, Ms. Gonsalez has previously undergone an appendectomy, repair of a deviated nasal septum, a right ovarian cystectomy, a pelvic sling procedure, and a tubal ligation.\par \par In November 2017, Ms. Gonsalez had experienced a brief episode of syncope while sitting in a chair.  Following that event, I had referred her for a transthoracic echocardiogram in addition to a Holter monitor.  In summary, the transthoracic echocardiogram demonstrated normal LV and RV systolic function, with no regional wall motion abnormalities.  There was mild-moderate mitral regurgitation as well as mild aortic regurgitation.  There was evidence of mild pulmonary hypertension, with an estimated pulmonary artery systolic pressure of 45 mmHg.  Ms. Gonsalez' Holter monitor demonstrated sinus rhythm with heart rates between 40/minute and 97/minute.  Rare premature ventricular complexes were noted.  In addition, there were occasional premature supraventricular complexes.  There were two very brief (3 beats) runs of SVT, with the fastest being at 138/minute.\par \par At the time of a previous office visit (May 25th, 2018), Ms. Gonsalez was found to have a resting heart rate of 42/minute on ECG.  In light of the fact that she had experienced recent episodes of lightheadedness, we had discontinued her metoprolol as this may have been contributing to the bradycardia.\par \par Recently, Ms. Gonsalez has experienced bilateral bloody discharge from both breast nipples (left more than right) in addition to bilateral breast pain.  She underwent a mammogram on June 8th, 2018.  In summary, this demonstrated fibroglandular breasts with no radiographic signs of malignancy.  A follow up breast ultrasound (also performed on June 8th, 2018) demonstrated retroareolar ductal prominence but was otherwise unremarkable.  No masses were noted.  Ms. Gonsalez had a breast MRI performed on July 17th, 2018 for further evaluation and this demonstrated no evidence of breast malignancy.\par \par At the time of a recent office visit (July 13th, 2018), Ms. Gonsalez was found to be in atrial fibrillation with a somewhat rapid ventricular response (/minute).  This correlated with her recent symptoms of palpitations and a pounding sensation in her chest.  Because of the slightly rapid ventricular response to the atrial fibrillation, Ms. Gonsalez was started back on beta blocker therapy (with metoprolol tartrate 25 mg BID).  Ms. Gonsalez declined to be started on anticoagulation therapy and she was started on aspirin instead.  However, subsequent electrocardiograms have demonstrated sinus rhythm.\par \par Ms. Gonsalez reports that she has been feeling well since her last office visit.  She describes having had several episodes of dizziness over the course of one week, which occurred a few months ago.  Otherwise she has been able to ambulate without difficulty and denies having any chest pain or dyspnea either at rest or with exertion.  In addition, Ms. Gonsalez denies having any palpitations, presyncope, or syncope.  There has been no history of orthopnea, paroxysmal nocturnal dyspnea, or edema.

## 2019-11-29 ENCOUNTER — RX RENEWAL (OUTPATIENT)
Age: 74
End: 2019-11-29

## 2019-12-19 ENCOUNTER — APPOINTMENT (OUTPATIENT)
Dept: BREAST CENTER | Facility: CLINIC | Age: 74
End: 2019-12-19
Payer: MEDICARE

## 2019-12-19 VITALS
SYSTOLIC BLOOD PRESSURE: 120 MMHG | HEART RATE: 71 BPM | HEIGHT: 64 IN | OXYGEN SATURATION: 96 % | BODY MASS INDEX: 32.44 KG/M2 | WEIGHT: 190 LBS | TEMPERATURE: 97.6 F | DIASTOLIC BLOOD PRESSURE: 73 MMHG

## 2019-12-19 PROCEDURE — 99215 OFFICE O/P EST HI 40 MIN: CPT

## 2019-12-19 NOTE — ASSESSMENT
[FreeTextEntry1] : 73 yo female presents for clinical evaluation secondary to history of spontaneous bloody nipple discharge (patient was on Estrace cream for chronic UTI , began taking 9/25/2017 until 5/2018).  Recent imaging and clinical breast exam is benign.  Recommendation for follow up visit in 6 months for clinical breast exam and continue annual screening mammogram and sonogram.\par 1.  Continue annual screening mammogram and sonogram due 6/2020\par 2. Follow up exam in 6 months 6/2020 for clinical breast exam

## 2019-12-19 NOTE — PHYSICAL EXAM
[Normocephalic] : normocephalic [EOMI] : extra ocular movement intact [PERRL] : pupils equal, round and reactive to light [Atraumatic] : atraumatic [Supple] : supple [Sclera nonicteric] : sclera nonicteric [No Supraclavicular Adenopathy] : no supraclavicular adenopathy [No dominant masses] : no dominant masses in right breast  [Examined in the supine and seated position] : examined in the supine and seated position [No dominant masses] : no dominant masses left breast [No Nipple Retraction] : no right nipple retraction [Breast Abnormal Secretion Bloody Fluid Right] : no bloody discharge [No Nipple Discharge] : no right nipple discharge [Breast Abnormal Secretion Bloody Fluid Left] : no bloody discharge [Breast Nipple Retraction] : nipples not retracted [Breast Nipple Inversion] : nipples inverted [Breast Abnormal Lactation (Galactorrhea)] : no galactorrhea [Breast Nipple Fissures] : nipples not fissured [Breast Nipple Flattening] : nipples not flattened [Breast Abnormal Secretion Serous Fluid] : no serous discharge [Breast Abnormal Secretion Opalescent Fluid] : no milky discharge [No Axillary Lymphadenopathy] : no left axillary lymphadenopathy [No Edema] : no edema [No Ulceration] : no ulceration [No Rashes] : no rashes [de-identified] : No supraclavicular or axillary adenopathy. No dominant masses, normal to palpation. inverted nipple. No skin changes.\par  [de-identified] : No supraclavicular or axillary adenopathy. No dominant masses, normal to palpation. Inverted nipple without discharge. No skin changes.\par

## 2019-12-19 NOTE — PAST MEDICAL HISTORY
[Menopause Age____] : age at menopause was [unfilled] [Postmenopausal] : The patient is postmenopausal [Menarche Age ____] : age at menarche was [unfilled] [Live Births ___] : P[unfilled]  [Total Preg ___] : G[unfilled] [Age At Live Birth ___] : Age at live birth: [unfilled]

## 2019-12-19 NOTE — HISTORY OF PRESENT ILLNESS
[FreeTextEntry1] : Referring Physician:  Dr. Ebony You\par I had the pleasure of seeing PARRIS HUTSON  in the office today for a follow up breast exam s/p history with bloody nipple discharge.\par \par Parris is a dominik 73 yo female who is in overall good health.  She states she was put on Estrace cream for her chronic urinary tract infection in 2017.  She developed spontaneous bloody nipple discharge in May of 2018 and saw her urologist who then discontinued the Estrace cream and she has not had any problems since.  She underwent diagnostic mammogram/sonogram which both came back benign.  She is here today for clinical evaluation.\par \par She denies dominant breast mass, skin changes or nipple discharge. She denies headaches, blurry vision, chest pain, SOB, abdominal pain, joint aches or difficult walking.\par \par  with 1st delivery at 23.  Menses began at age 12.\par She denies personal history of malignancy.\par She denies family history.\par \par Imaging:  [facility]\par 2019:  BILATERAL DIAGNOSTIC MAMMOGRAM/ BILATERAL DIAGNOSTIC BREAST ULTRASOUND:  No mammographic or sonographic evidence of malignancy.  BIRADS 2 Benign findings.\par \par 2018  MR BREAST WAWIC BI W CAD:\par Impression:  No MRI evidence of malignancy.  BIRADS 2 Benign findings.\par \par We reviewed clinical exam and recent imaging.  Both clinical exam and recent imaging is benign today.  Recommendation to continue annual screening mammogram and sonogram in 2019 and follow up clinical breast exam in  after imaging.  She understands and agrees to plan.  All questions answered.

## 2020-01-10 ENCOUNTER — NON-APPOINTMENT (OUTPATIENT)
Age: 75
End: 2020-01-10

## 2020-01-10 ENCOUNTER — APPOINTMENT (OUTPATIENT)
Dept: CV DIAGNOSITCS | Facility: HOSPITAL | Age: 75
End: 2020-01-10

## 2020-01-10 ENCOUNTER — APPOINTMENT (OUTPATIENT)
Dept: CARDIOLOGY | Facility: CLINIC | Age: 75
End: 2020-01-10
Payer: MEDICARE

## 2020-01-10 ENCOUNTER — OUTPATIENT (OUTPATIENT)
Dept: OUTPATIENT SERVICES | Facility: HOSPITAL | Age: 75
LOS: 1 days | End: 2020-01-10

## 2020-01-10 VITALS
HEIGHT: 64 IN | WEIGHT: 189 LBS | OXYGEN SATURATION: 97 % | BODY MASS INDEX: 32.27 KG/M2 | HEART RATE: 76 BPM | DIASTOLIC BLOOD PRESSURE: 76 MMHG | SYSTOLIC BLOOD PRESSURE: 112 MMHG

## 2020-01-10 DIAGNOSIS — I48.91 UNSPECIFIED ATRIAL FIBRILLATION: ICD-10-CM

## 2020-01-10 PROCEDURE — 99214 OFFICE O/P EST MOD 30 MIN: CPT

## 2020-01-10 PROCEDURE — 93000 ELECTROCARDIOGRAM COMPLETE: CPT

## 2020-01-12 ENCOUNTER — RX RENEWAL (OUTPATIENT)
Age: 75
End: 2020-01-12

## 2020-01-15 NOTE — DISCUSSION/SUMMARY
[FreeTextEntry1] : Of note is that Ms. Gonsalez had a follow up transthoracic echocardiogram performed at the time of today's office visit.  In summary, this demonstrated normal LV and RV systolic function, with no regional wall motion abnormalities (LVEF 60%).  There was mild to moderate mitral regurgitation and mild aortic regurgitation.  In summary, there were no significant changes compared to Ms. Gonsalez' previous study of 11/16/2018.\par \par In summary, today's 12-lead electrocardiogram demonstrates that Ms. Gonsalez remains in atrial fibrillation.  However, she appears to be minimally symptomatic from the arrhythmia at this time.  In addition, the atrial fibrillation appears well rate controlled on her current medical regimen.  We again had a discussion regarding the use of thromboembolic prophylaxis in the setting of atrial fibrillation, which may be chronic.  I reiterated my recommendation to Ms. Gonsalez that she be treated with anticoagulation therapy for thromboembolic prophylaxis, as her [id number] score is elevated at 4 (two points for age 74 years and one point each for hypertension and female gender).  However, Ms. Gonsalez continues to decline taking anticoagulation therapy despite the increased risk of stroke or other thromboembolic complications.  Of note is that Ms. Gonsalez is not a candidate for a RICH-guided cardioversion out of atrial fibrillation as she is refusing to be on anticoagulation therapy.  She will therefore continue taking aspirin.  Ms. Gonsalez will follow up with me in approximately six months.

## 2020-01-15 NOTE — HISTORY OF PRESENT ILLNESS
[FreeTextEntry1] : Ms. Gonsalez presents to the office today for a follow up visit.\par \par Ms. Gonsalez is a 74 year old female with a medical history significant for hypertension, hyperlipidemia, recently diagnosed atrial fibrillation, hypothyroidism, migraine headaches, and frequent urinary tract infections.  In addition, Ms. Gonsalez has previously undergone an appendectomy, repair of a deviated nasal septum, a right ovarian cystectomy, a pelvic sling procedure, and a tubal ligation.\par \par In November 2017, Ms. Gonsalez had experienced a brief episode of syncope while sitting in a chair.  Following that event, I had referred her for a transthoracic echocardiogram in addition to a Holter monitor.  In summary, the transthoracic echocardiogram demonstrated normal LV and RV systolic function, with no regional wall motion abnormalities.  There was mild-moderate mitral regurgitation as well as mild aortic regurgitation.  There was evidence of mild pulmonary hypertension, with an estimated pulmonary artery systolic pressure of 45 mmHg.  Ms. Gonsalez' Holter monitor demonstrated sinus rhythm with heart rates between 40/minute and 97/minute.  Rare premature ventricular complexes were noted.  In addition, there were occasional premature supraventricular complexes.  There were two very brief (3 beats) runs of SVT, with the fastest being at 138/minute.\par \par At the time of a previous office visit (May 25th, 2018), Ms. Gonsalez was found to have a resting heart rate of 42/minute on ECG.  In light of the fact that she had experienced recent episodes of lightheadedness, we had discontinued her metoprolol as this may have been contributing to the bradycardia.\par \par In spring 2018, Ms. Gonsalez has experienced bilateral bloody discharge from both breast nipples (left more than right) in addition to bilateral breast pain.  She underwent a mammogram on June 8th, 2018.  In summary, this demonstrated fibroglandular breasts with no radiographic signs of malignancy.  A follow up breast ultrasound (also performed on June 8th, 2018) demonstrated retroareolar ductal prominence but was otherwise unremarkable.  No masses were noted.  Ms. Gonsalez had a breast MRI performed on July 17th, 2018 for further evaluation and this demonstrated no evidence of breast malignancy.\par \par At the time of a previous office visit (July 13th, 2018), Ms. Gonsalez was found to be in atrial fibrillation with a somewhat rapid ventricular response (/minute).  This correlated with her recent symptoms of palpitations and a pounding sensation in her chest.  Because of the slightly rapid ventricular response to the atrial fibrillation, Ms. Gonsalez was started back on beta blocker therapy (with metoprolol tartrate 25 mg BID).  Ms. Gonsalez declined to be started on anticoagulation therapy and she was started on aspirin instead.  However, subsequent electrocardiograms have demonstrated sinus rhythm.\par \par Ms. Gonsalez reports that she has been feeling reasonably well since her last office visit.  She has been able to ambulate without difficulty and denies having any chest pain or dyspnea either at rest or with exertion.  In addition, there has been no history of palpitations, presyncope, or syncope.  Ms. Gonsalez denies having any orthopnea, paroxysmal nocturnal dyspnea, or edema.

## 2020-01-15 NOTE — PHYSICAL EXAM
[General Appearance - Well Developed] : well developed [Normal Conjunctiva] : the conjunctiva exhibited no abnormalities [General Appearance - Well Nourished] : well nourished [Eyelids - No Xanthelasma] : the eyelids demonstrated no xanthelasmas [Normal Oral Mucosa] : normal oral mucosa [No Oral Cyanosis] : no oral cyanosis [Normal Jugular Venous A Waves Present] : normal jugular venous A waves present [Normal Jugular Venous V Waves Present] : normal jugular venous V waves present [] : no respiratory distress [Respiration, Rhythm And Depth] : normal respiratory rhythm and effort [Auscultation Breath Sounds / Voice Sounds] : lungs were clear to auscultation bilaterally [Heart Sounds] : normal S1 and S2 [Murmurs] : no murmurs present [Edema] : no peripheral edema present [Bowel Sounds] : normal bowel sounds [Abdomen Tenderness] : non-tender [Abdomen Soft] : soft [Abnormal Walk] : normal gait [Nail Clubbing] : no clubbing of the fingernails [Cyanosis, Localized] : no localized cyanosis [Skin Color & Pigmentation] : normal skin color and pigmentation [Oriented To Time, Place, And Person] : oriented to person, place, and time

## 2020-06-14 ENCOUNTER — RX RENEWAL (OUTPATIENT)
Age: 75
End: 2020-06-14

## 2020-07-08 ENCOUNTER — APPOINTMENT (OUTPATIENT)
Dept: OBGYN | Facility: CLINIC | Age: 75
End: 2020-07-08
Payer: MEDICARE

## 2020-07-08 VITALS — SYSTOLIC BLOOD PRESSURE: 127 MMHG | WEIGHT: 185 LBS | BODY MASS INDEX: 31.76 KG/M2 | DIASTOLIC BLOOD PRESSURE: 80 MMHG

## 2020-07-08 PROCEDURE — 82270 OCCULT BLOOD FECES: CPT

## 2020-07-08 PROCEDURE — G0101: CPT

## 2020-07-08 NOTE — HISTORY OF PRESENT ILLNESS
[___ Year(s) Ago] : [unfilled] year(s) ago [Good] : being in good health [Healthy Diet] : a healthy diet [Regular Exercise] : regular exercise [Weight Concerns] : no concerns with her weight [Last Pap ___] : Last cervical pap smear was [unfilled] [Postmenopausal] : is postmenopausal [Currently In Menopause] : currently in menopause [Experiencing Menopausal Sxs] : not experiencing menopausal symptoms [Menopause Age: ____] : age at menopause was [unfilled]

## 2020-07-08 NOTE — PHYSICAL EXAM
[Awake] : awake [Acute Distress] : no acute distress [Alert] : alert [Mass] : no breast mass [Axillary LAD] : no axillary lymphadenopathy [Nipple Discharge] : no nipple discharge [Soft] : soft [Oriented x3] : oriented to person, place, and time [Tender] : non tender [Normal] : cervix [Uterine Adnexae] : were not tender and not enlarged [Occult Blood] : occult blood test from digital rectal exam was negative [No Bleeding] : there was no active vaginal bleeding

## 2020-07-09 LAB — HPV HIGH+LOW RISK DNA PNL CVX: NOT DETECTED

## 2020-07-13 LAB — CYTOLOGY CVX/VAG DOC THIN PREP: NORMAL

## 2020-07-17 ENCOUNTER — APPOINTMENT (OUTPATIENT)
Dept: CARDIOLOGY | Facility: CLINIC | Age: 75
End: 2020-07-17
Payer: MEDICARE

## 2020-07-17 ENCOUNTER — NON-APPOINTMENT (OUTPATIENT)
Age: 75
End: 2020-07-17

## 2020-07-17 VITALS
HEART RATE: 69 BPM | SYSTOLIC BLOOD PRESSURE: 124 MMHG | OXYGEN SATURATION: 97 % | BODY MASS INDEX: 32.27 KG/M2 | TEMPERATURE: 97 F | HEIGHT: 64 IN | DIASTOLIC BLOOD PRESSURE: 86 MMHG

## 2020-07-17 VITALS — WEIGHT: 188 LBS | BODY MASS INDEX: 32.27 KG/M2

## 2020-07-17 PROCEDURE — 93000 ELECTROCARDIOGRAM COMPLETE: CPT

## 2020-07-17 PROCEDURE — 99214 OFFICE O/P EST MOD 30 MIN: CPT

## 2020-07-20 NOTE — HISTORY OF PRESENT ILLNESS
[FreeTextEntry1] : Ms. Gonsalez presents to the office today for a follow up visit.\par \par Ms. Gonsalez is a 74 year old female with a medical history significant for hypertension, hyperlipidemia, recently diagnosed atrial fibrillation, hypothyroidism, migraine headaches, and frequent urinary tract infections.  In addition, Ms. Gonsalez has previously undergone an appendectomy, repair of a deviated nasal septum, a right ovarian cystectomy, a pelvic sling procedure, and a tubal ligation.\par \par In November 2017, Ms. Gonsalez had experienced a brief episode of syncope while sitting in a chair.  Following that event, I had referred her for a transthoracic echocardiogram in addition to a Holter monitor.  In summary, the transthoracic echocardiogram demonstrated normal LV and RV systolic function, with no regional wall motion abnormalities.  There was mild-moderate mitral regurgitation as well as mild aortic regurgitation.  There was evidence of mild pulmonary hypertension, with an estimated pulmonary artery systolic pressure of 45 mmHg.  Ms. Gonsalez' Holter monitor demonstrated sinus rhythm with heart rates between 40/minute and 97/minute.  Rare premature ventricular complexes were noted.  In addition, there were occasional premature supraventricular complexes.  There were two very brief (3 beats) runs of SVT, with the fastest being at 138/minute.\par \par At the time of a previous office visit (May 25th, 2018), Ms. Gonsalez was found to have a resting heart rate of 42/minute on ECG.  In light of the fact that she had experienced recent episodes of lightheadedness, we had discontinued her metoprolol as this may have been contributing to the bradycardia.\par \par In spring 2018, Ms. Gonsalez has experienced bilateral bloody discharge from both breast nipples (left more than right) in addition to bilateral breast pain.  She underwent a mammogram on June 8th, 2018.  In summary, this demonstrated fibroglandular breasts with no radiographic signs of malignancy.  A follow up breast ultrasound (also performed on June 8th, 2018) demonstrated retroareolar ductal prominence but was otherwise unremarkable.  No masses were noted.  Ms. Gonsalez had a breast MRI performed on July 17th, 2018 for further evaluation and this demonstrated no evidence of breast malignancy.\par \par At the time of a previous office visit (July 13th, 2018), Ms. Gonsalez was found to be in atrial fibrillation with a somewhat rapid ventricular response (/minute).  This correlated with her recent symptoms of palpitations and a pounding sensation in her chest.  Because of the slightly rapid ventricular response to the atrial fibrillation, Ms. Gonsalez was started back on beta blocker therapy (with metoprolol tartrate 25 mg BID).  Ms. Gonsalez declined to be started on anticoagulation therapy and she was started on aspirin instead.   \par \par Ms. Gonsalez reports that she has been experiencing occasional episodes of chest pressure.  These have occurred mostly while she is at rest and not as frequently with physical exertion.  The chest pressure radiates into the neck but not down the arms and there is no associated nausea, dyspnea, or diaphoresis.  Ms. Gonsalez reports that the chest pressure improves with drinking water.  The chest pressure lasts for several minutes at a time before resolving spontaneously or with drinking water.  Ms. Gonsalez denies having any palpitations, presyncope, or syncope.  There has been no history of orthopnea, paroxysmal nocturnal dyspnea, or edema.\par \par Ms. Gonsalez most recent blood work (from 7/10/2020) was reviewed.  In summary, a complete blood count demonstrated a WBC of 5.3K, hemoglobin 13.2 g/dL, hematocrit 41.8%, and platelet 266K.  A metabolic panel demonstrated a serum potassium of 4.7 mmol/L and a serum creatinine of 1.10 mg/dL.  A lipid profile demonstrated a total cholesterol of 176 mg/dL, triglycerides 120 mg/dL, HDL 51 mg/dL, and  mg/dL.  The serum TSH was normal as was the vitamin D level.

## 2020-07-20 NOTE — PHYSICAL EXAM
[General Appearance - Well Developed] : well developed [General Appearance - Well Nourished] : well nourished [Eyelids - No Xanthelasma] : the eyelids demonstrated no xanthelasmas [Normal Conjunctiva] : the conjunctiva exhibited no abnormalities [Normal Oral Mucosa] : normal oral mucosa [Normal Jugular Venous A Waves Present] : normal jugular venous A waves present [No Oral Cyanosis] : no oral cyanosis [Normal Jugular Venous V Waves Present] : normal jugular venous V waves present [] : no respiratory distress [Respiration, Rhythm And Depth] : normal respiratory rhythm and effort [Auscultation Breath Sounds / Voice Sounds] : lungs were clear to auscultation bilaterally [Edema] : no peripheral edema present [Heart Sounds] : normal S1 and S2 [Murmurs] : no murmurs present [Bowel Sounds] : normal bowel sounds [Abdomen Soft] : soft [Abdomen Tenderness] : non-tender [Abnormal Walk] : normal gait [Nail Clubbing] : no clubbing of the fingernails [Cyanosis, Localized] : no localized cyanosis [Skin Color & Pigmentation] : normal skin color and pigmentation [Oriented To Time, Place, And Person] : oriented to person, place, and time

## 2020-07-20 NOTE — DISCUSSION/SUMMARY
[FreeTextEntry1] : In summary, Ms. Gonsalez has been experiencing episodic chest pressure of uncertain etiology.  As certain aspects of her symptoms are worrisome for possible angina, I suggested to her that we investigate further with nuclear stress testing.  However, Ms. Gonsalez is reluctant to undergo further evaluation and is currently declining to undergo stress testing.  I urged her to inform me should she experience further chest discomfort.\par \par Today's 12-lead ECG demonstrates that Ms. Gonsalez remains in atrial fibrillation.  She continues to be minimally symptomatic from the arrhythmia, which is well rate controlled on her current medical regimen.  Ms. Gonsalez continues to decline being started on therapeutic anticoagulation for thromboembolic prophylaxis in the setting of atrial fibrillation.  We again discussed the risk of thromboembolic complications (such as stroke) in the setting of atrial fibrillation without therapeutic anticoagulation.  With Ms. Gonsalez' elevated [id number] score of 4 (two points for age 74 years  and one point each for hypertension and female gender), therapeutic anticoagulation is indicated.  Ms. Gonsalez is aware of the risks of not being on an anticoagulant and continues to prefer to decline treatment.  She will therefore continue to take daily aspirin.  Ms. Gonsalez will follow up with me in approximately six months.

## 2020-09-21 ENCOUNTER — OUTPATIENT (OUTPATIENT)
Dept: OUTPATIENT SERVICES | Facility: HOSPITAL | Age: 75
LOS: 1 days | End: 2020-09-21

## 2020-09-21 ENCOUNTER — APPOINTMENT (OUTPATIENT)
Dept: ULTRASOUND IMAGING | Facility: CLINIC | Age: 75
End: 2020-09-21
Payer: MEDICARE

## 2020-09-21 DIAGNOSIS — N20.0 CALCULUS OF KIDNEY: ICD-10-CM

## 2020-09-21 PROCEDURE — 76770 US EXAM ABDO BACK WALL COMP: CPT | Mod: 26

## 2020-10-14 ENCOUNTER — APPOINTMENT (OUTPATIENT)
Dept: RADIOLOGY | Facility: CLINIC | Age: 75
End: 2020-10-14
Payer: MEDICARE

## 2020-10-14 ENCOUNTER — OUTPATIENT (OUTPATIENT)
Dept: OUTPATIENT SERVICES | Facility: HOSPITAL | Age: 75
LOS: 1 days | End: 2020-10-14
Payer: MEDICARE

## 2020-10-14 DIAGNOSIS — Z00.8 ENCOUNTER FOR OTHER GENERAL EXAMINATION: ICD-10-CM

## 2020-10-14 PROCEDURE — 72100 X-RAY EXAM L-S SPINE 2/3 VWS: CPT | Mod: 26

## 2020-10-14 PROCEDURE — 72100 X-RAY EXAM L-S SPINE 2/3 VWS: CPT

## 2020-11-09 DIAGNOSIS — M47.816 SPONDYLOSIS W/OUT MYELOPATHY OR RADICULOPATHY, LUMBAR REGION: ICD-10-CM

## 2020-11-10 ENCOUNTER — APPOINTMENT (OUTPATIENT)
Dept: ORTHOPEDIC SURGERY | Facility: CLINIC | Age: 75
End: 2020-11-10
Payer: MEDICARE

## 2020-11-10 VITALS — BODY MASS INDEX: 32.1 KG/M2 | WEIGHT: 188 LBS | HEIGHT: 64 IN

## 2020-11-10 VITALS — TEMPERATURE: 96.9 F

## 2020-11-10 DIAGNOSIS — Z86.79 PERSONAL HISTORY OF OTHER DISEASES OF THE CIRCULATORY SYSTEM: ICD-10-CM

## 2020-11-10 DIAGNOSIS — Z86.39 PERSONAL HISTORY OF OTHER ENDOCRINE, NUTRITIONAL AND METABOLIC DISEASE: ICD-10-CM

## 2020-11-10 DIAGNOSIS — M43.10 SPONDYLOLISTHESIS, SITE UNSPECIFIED: ICD-10-CM

## 2020-11-10 DIAGNOSIS — Z82.49 FAMILY HISTORY OF ISCHEMIC HEART DISEASE AND OTHER DISEASES OF THE CIRCULATORY SYSTEM: ICD-10-CM

## 2020-11-10 PROCEDURE — 99072 ADDL SUPL MATRL&STAF TM PHE: CPT

## 2020-11-10 PROCEDURE — 99204 OFFICE O/P NEW MOD 45 MIN: CPT

## 2020-11-10 RX ORDER — CIPROFLOXACIN HYDROCHLORIDE 500 MG/1
500 TABLET, FILM COATED ORAL
Qty: 20 | Refills: 0 | Status: ACTIVE | COMMUNITY
Start: 2020-11-02

## 2020-11-10 RX ORDER — METAXALONE 800 MG/1
800 TABLET ORAL
Qty: 20 | Refills: 0 | Status: ACTIVE | COMMUNITY
Start: 2020-10-13

## 2020-11-10 RX ORDER — MELOXICAM 15 MG/1
15 TABLET ORAL
Qty: 15 | Refills: 0 | Status: ACTIVE | COMMUNITY
Start: 2020-10-13

## 2020-11-10 RX ORDER — CEFPODOXIME PROXETIL 100 MG/1
100 TABLET, FILM COATED ORAL
Qty: 14 | Refills: 0 | Status: ACTIVE | COMMUNITY
Start: 2020-10-31

## 2020-11-10 RX ORDER — METHYLPREDNISOLONE 4 MG/1
4 TABLET ORAL
Qty: 1 | Refills: 0 | Status: ACTIVE | COMMUNITY
Start: 2020-11-10 | End: 1900-01-01

## 2020-11-10 NOTE — PHYSICAL EXAM
[Obese] : obese [Poor Appearance] : well-appearing [Acute Distress] : not in acute distress [de-identified] : CONSTITUTIONAL: The patient is a very pleasant individual who is well-nourished and who appears stated age.\par PSYCHIATRIC: The patient is alert and oriented X 3 and in no apparent distress, and participates with orthopedic evaluation well.\par HEAD: Atraumatic and is nonsyndromic in appearance.\par EENT: No visible thyromegaly, EOMI.\par RESPIRATORY: Respiratory rate is regular, not dyspneic on examination.\par LYMPHATICS: There is no inguinal lymphadenopathy\par INTEGUMENTARY: Skin is clean, dry, and intact about the bilateral lower extremities and lumbar spine.\par VASCULAR: There is brisk capillary refill about the bilateral lower extremities.\par NEUROLOGIC: There are no pathologic reflexes. There is no decrease in sensation of the bilateral lower extremities on Wartenberg pinwheel examination. Deep tendon reflexes are well maintained at 2+/4 of the bilateral lower extremities and are symmetric.\par MUSCULOSKELETAL: There is no visible muscular atrophy. Manual motor strength is well maintained in the bilateral lower extremities. Range of motion of lumbar spine is well maintained. The patient ambulates in a non-myelopathic manner. Negative tension sign and straight leg raise bilaterally. Quad extension, ankle dorsiflexion, EHL, plantar flexion, and ankle eversion are well preserved. Normal secondary orthopaedic exam of bilateral hips, greater trochanteric area, knees and ankles \par \par Mild lumbar myositis.  [de-identified] : Xray of the lumbar spine taken 10/14/2020 at [facility] demonstrates trace spondylolisthesis at L3-L4, well preserved lordosis and intervertebral disc spaces.

## 2020-11-10 NOTE — HISTORY OF PRESENT ILLNESS
[de-identified] : 75 year F presents for an initial evaluation of a sensation of clicking and pain in the lower back when standing up after bending intermittently for her entire life. In October of 2020 she had a severe episode of lower back pain which was worsened with any change in postion and better only with sitting or standing very straight. This lasted for 10 days. she saw her PCP who prescribed muscle relaxants and meloxicam. Pain level is currently 0. No PT, chiropractics, or pain management. No radiating pain down the LE.  [Ataxia] : no ataxia [Incontinence] : no incontinence [Loss of Dexterity] : good dexterity [Urinary Ret.] : no urinary retention

## 2020-11-10 NOTE — DISCUSSION/SUMMARY
[de-identified] : Patient has been referred to physical therapy for decreased pain modalities, core strengthening modalities to prevent further instances of this discogenic type pain, proper lifting and bending training, soft tissue modalities, and physical modalities. I will provide a Medrol dose pack for pain relief in case of another acute episode. Patient to follow up on a PRN basis.

## 2020-11-10 NOTE — ADDENDUM
[FreeTextEntry1] : Documented by Nicholas Caraballo acting as a scribe for Ruth Brantley  on 08/20/2020. All medical record entries made by the Scribe were at my, Ruth Brantley , direction and personally dictated by me on 08/20/2020 . I have reviewed the chart and agree that the record accurately reflects my personal performance of the history, physical exam, assessment and plan. I have also personally directed, reviewed, and agreed with the chart.

## 2020-11-14 ENCOUNTER — RX RENEWAL (OUTPATIENT)
Age: 75
End: 2020-11-14

## 2020-11-30 ENCOUNTER — RX RENEWAL (OUTPATIENT)
Age: 75
End: 2020-11-30

## 2021-01-19 ENCOUNTER — APPOINTMENT (OUTPATIENT)
Dept: SURGERY | Facility: CLINIC | Age: 76
End: 2021-01-19
Payer: MEDICARE

## 2021-01-19 VITALS
DIASTOLIC BLOOD PRESSURE: 91 MMHG | WEIGHT: 187 LBS | HEART RATE: 80 BPM | HEIGHT: 64 IN | SYSTOLIC BLOOD PRESSURE: 141 MMHG | OXYGEN SATURATION: 97 % | TEMPERATURE: 98.1 F | BODY MASS INDEX: 31.92 KG/M2

## 2021-01-19 DIAGNOSIS — N63.0 UNSPECIFIED LUMP IN UNSPECIFIED BREAST: ICD-10-CM

## 2021-01-19 PROCEDURE — 99072 ADDL SUPL MATRL&STAF TM PHE: CPT

## 2021-01-19 PROCEDURE — 99214 OFFICE O/P EST MOD 30 MIN: CPT

## 2021-01-22 ENCOUNTER — NON-APPOINTMENT (OUTPATIENT)
Age: 76
End: 2021-01-22

## 2021-01-22 ENCOUNTER — APPOINTMENT (OUTPATIENT)
Dept: CARDIOLOGY | Facility: CLINIC | Age: 76
End: 2021-01-22
Payer: MEDICARE

## 2021-01-22 VITALS
DIASTOLIC BLOOD PRESSURE: 83 MMHG | TEMPERATURE: 97.2 F | OXYGEN SATURATION: 97 % | BODY MASS INDEX: 31.92 KG/M2 | HEART RATE: 81 BPM | SYSTOLIC BLOOD PRESSURE: 117 MMHG | RESPIRATION RATE: 16 BRPM | HEIGHT: 64 IN | WEIGHT: 187 LBS

## 2021-01-22 DIAGNOSIS — K63.5 POLYP OF COLON: ICD-10-CM

## 2021-01-22 DIAGNOSIS — Z86.010 PERSONAL HISTORY OF COLONIC POLYPS: ICD-10-CM

## 2021-01-22 PROCEDURE — 99072 ADDL SUPL MATRL&STAF TM PHE: CPT

## 2021-01-22 PROCEDURE — 99214 OFFICE O/P EST MOD 30 MIN: CPT

## 2021-01-22 PROCEDURE — 93000 ELECTROCARDIOGRAM COMPLETE: CPT

## 2021-01-23 PROBLEM — K63.5 COLON POLYPS: Status: ACTIVE | Noted: 2021-01-23

## 2021-01-23 PROBLEM — Z86.010 HISTORY OF COLONIC POLYPS: Status: RESOLVED | Noted: 2021-01-23 | Resolved: 2021-01-23

## 2021-01-23 NOTE — HISTORY OF PRESENT ILLNESS
[FreeTextEntry1] : Ms. Gonsalez presents to the office today for a follow up visit.\par \par Ms. Gonsalez is a 75 year old female with a medical history significant for hypertension, hyperlipidemia, recently diagnosed atrial fibrillation, hypothyroidism, migraine headaches, colon polyps, and frequent urinary tract infections.  In addition, Ms. Gonsalez has previously undergone an appendectomy, repair of a deviated nasal septum, a right ovarian cystectomy, a pelvic sling procedure, and a tubal ligation.\par \par In November 2017, Ms. Gonsalez had experienced a brief episode of syncope while sitting in a chair.  Following that event, I had referred her for a transthoracic echocardiogram in addition to a Holter monitor.  In summary, the transthoracic echocardiogram demonstrated normal LV and RV systolic function, with no regional wall motion abnormalities.  There was mild-moderate mitral regurgitation as well as mild aortic regurgitation.  There was evidence of mild pulmonary hypertension, with an estimated pulmonary artery systolic pressure of 45 mmHg.  Ms. Gonsalez' Holter monitor demonstrated sinus rhythm with heart rates between 40/minute and 97/minute.  Rare premature ventricular complexes were noted.  In addition, there were occasional premature supraventricular complexes.  There were two very brief (3 beats) runs of SVT, with the fastest being at 138/minute.\par \par At the time of a previous office visit (May 25th, 2018), Ms. Gonsalez was found to have a resting heart rate of 42/minute on ECG.  In light of the fact that she had experienced recent episodes of lightheadedness, we had discontinued her metoprolol as this may have been contributing to the bradycardia.\par \par In spring 2018, Ms. Gonsalez has experienced bilateral bloody discharge from both breast nipples (left more than right) in addition to bilateral breast pain.  She underwent a mammogram on June 8th, 2018.  In summary, this demonstrated fibroglandular breasts with no radiographic signs of malignancy.  A follow up breast ultrasound (also performed on June 8th, 2018) demonstrated retroareolar ductal prominence but was otherwise unremarkable.  No masses were noted.  Ms. Gonsalez had a breast MRI performed on July 17th, 2018 for further evaluation and this demonstrated no evidence of breast malignancy.\par \par At the time of a previous office visit (July 13th, 2018), Ms. Gonsalez was found to be in atrial fibrillation with a somewhat rapid ventricular response (/minute).  This correlated with her recent symptoms of palpitations and a pounding sensation in her chest.  Because of the slightly rapid ventricular response to the atrial fibrillation, Ms. Gonsalez was started back on beta blocker therapy (with metoprolol tartrate 25 mg BID).  Ms. Gonsalez declined to be started on anticoagulation therapy and she was started on aspirin instead.   \par \par Ms. Gonsalez reports that she continues to experience intermittent episodes of chest discomfort.  Her most recent episode occurred yesterday while she was at rest.  The chest discomfort felt like a burning sensation with radiation to the neck but not down the arms.  Ms. Gonsalez felt mildly dyspneic at the time of the episode but did not experience any nausea or diaphoresis.  The chest discomfort resolved after she drank some water.  Ms. Gonsalez reports that she experiences mild dyspnea with activities such as climbing stairs or walking briskly.  She has not had any dyspnea at rest.  There has been no recent history of palpitations, presyncope, or syncope.  Ms. Gonsalez denies having any orthopnea, paroxysmal nocturnal dyspnea, or edema.  Of note is that Ms. Gonsalez is scheduled for a colonoscopy in a few weeks, in order to follow up on a known history of colon polyps.

## 2021-01-23 NOTE — PHYSICAL EXAM
[General Appearance - Well Developed] : well developed [General Appearance - Well Nourished] : well nourished [Normal Conjunctiva] : the conjunctiva exhibited no abnormalities [Eyelids - No Xanthelasma] : the eyelids demonstrated no xanthelasmas [Normal Oral Mucosa] : normal oral mucosa [No Oral Cyanosis] : no oral cyanosis [Normal Jugular Venous A Waves Present] : normal jugular venous A waves present [Normal Jugular Venous V Waves Present] : normal jugular venous V waves present [] : no respiratory distress [Respiration, Rhythm And Depth] : normal respiratory rhythm and effort [Auscultation Breath Sounds / Voice Sounds] : lungs were clear to auscultation bilaterally [Heart Sounds] : normal S1 and S2 [Murmurs] : no murmurs present [Edema] : no peripheral edema present [Bowel Sounds] : normal bowel sounds [Abdomen Tenderness] : non-tender [Abdomen Soft] : soft [Abnormal Walk] : normal gait [Nail Clubbing] : no clubbing of the fingernails [Skin Color & Pigmentation] : normal skin color and pigmentation [Cyanosis, Localized] : no localized cyanosis [Oriented To Time, Place, And Person] : oriented to person, place, and time

## 2021-01-23 NOTE — DISCUSSION/SUMMARY
[FreeTextEntry1] : In summary, Ms. Gonsalez continues to experience chest discomfort of uncertain etiology.  I recommended to her that we evaluate these symptoms further with a nuclear stress test.  However, Ms. Gonsalez continues to decline to undergo stress testing at this time despite the knowledge that her symptoms may reflect the presence of ischemic heart disease.\par \par Ms. Gonsalez also continues to decline being started on therapeutic anticoagulation for thromboembolic prophylaxis in the setting of chronic atrial fibrillation.  We discussed again the risk of thromboembolic complications, most notably of stroke, occurring without the protection provided by therapeutic anticoagulation.  I again reiterated that, given Ms. Gonsalez' elevated [id number] score of 4 (two points for age 75 years and one point each for hypertension and female gender), therapeutic anticoagulation is indicated.  However, Ms. Gonsalez continues to decline being started on anticoagulation therapy.\par \par Ms. Gonsalez will follow up with me in approximately four months, at which time we will obtain a follow up transthoracic echocardiogram, as it will have been approximately one-and-a-half years since her last study.

## 2021-01-25 PROBLEM — N63.0 BREAST NODULE: Status: ACTIVE | Noted: 2018-06-05

## 2021-01-25 NOTE — PHYSICAL EXAM
[Normocephalic] : normocephalic [Atraumatic] : atraumatic [EOMI] : extra ocular movement intact [PERRL] : pupils equal, round and reactive to light [Sclera nonicteric] : sclera nonicteric [Supple] : supple [No Supraclavicular Adenopathy] : no supraclavicular adenopathy [Examined in the supine and seated position] : examined in the supine and seated position [No dominant masses] : no dominant masses in right breast  [No dominant masses] : no dominant masses left breast [No Nipple Retraction] : no left nipple retraction [No Nipple Discharge] : no right nipple discharge [Breast Nipple Inversion] : nipples inverted [No Axillary Lymphadenopathy] : no left axillary lymphadenopathy [No Edema] : no edema [No Rashes] : no rashes [No Ulceration] : no ulceration [Breast Abnormal Secretion Bloody Fluid Right] : no bloody discharge [Breast Abnormal Secretion Bloody Fluid Left] : no bloody discharge [Breast Nipple Retraction] : nipples not retracted [Breast Nipple Flattening] : nipples not flattened [Breast Nipple Fissures] : nipples not fissured [Breast Abnormal Lactation (Galactorrhea)] : no galactorrhea [Breast Abnormal Secretion Serous Fluid] : no serous discharge [Breast Abnormal Secretion Opalescent Fluid] : no milky discharge [de-identified] : No supraclavicular or axillary adenopathy. No dominant masses, normal to palpation. Inverted nipple without discharge. No skin changes.\par  [de-identified] : No supraclavicular or axillary adenopathy. No dominant masses, normal to palpation. inverted nipple. No skin changes.\par

## 2021-01-25 NOTE — ASSESSMENT
[FreeTextEntry1] : 76 yo female presents for clinical evaluation secondary to history of spontaneous bloody nipple discharge (patient was on Estrace cream for chronic UTI , began taking 9/25/2017 until 5/2018).  Recent imaging and clinical breast exam is benign.  Recommendation for follow up visit in 6 months for clinical breast exam and continue annual screening mammogram and sonogram.\par 1.  Continue annual screening mammogram and sonogram due 6/2021\par 2. Follow up exam in 6 months 7/2021 for clinical breast exam

## 2021-01-25 NOTE — HISTORY OF PRESENT ILLNESS
[FreeTextEntry1] : Referring Physician:  Dr. Ebony You\par I had the pleasure of seeing PARRIS HUTSON  in the office today for a follow up breast exam s/p history with bloody nipple discharge.\par \par Parris is a dominik 76 yo female who is in overall good health.  She states she was put on Estrace cream for her chronic urinary tract infection in 2017.  She developed spontaneous bloody nipple discharge in May of 2018 and saw her urologist who then discontinued the Estrace cream and she has not had any problems since.  She underwent diagnostic mammogram/sonogram which both came back benign.  She is here today for clinical evaluation.\par \par She denies dominant breast mass, skin changes or nipple discharge. She denies headaches, blurry vision, chest pain, SOB, abdominal pain, joint aches or difficult walking.\par \par  with 1st delivery at 23.  Menses began at age 12.\par She denies personal history of malignancy.\par She denies family history.\par \par Imaging:  [facility]\par 2020  Digital bilateral screening mammogram with CAD and tomosynthesis and breast ultrasound\par Impression:  Benign findings.  No mammographic or ultrasonographic evidence of malignancy.  BIRADS 2 benign.\par \par We reviewed clinical exam and recent imaging.  Both clinical exam and recent imaging is benign today.  Recommendation to continue annual screening mammogram and sonogram in 2021 and follow up clinical breast exam in  after imaging.  She understands and agrees to plan.  All questions answered.

## 2021-03-29 ENCOUNTER — RX RENEWAL (OUTPATIENT)
Age: 76
End: 2021-03-29

## 2021-05-06 ENCOUNTER — RESULT REVIEW (OUTPATIENT)
Age: 76
End: 2021-05-06

## 2021-05-21 ENCOUNTER — OUTPATIENT (OUTPATIENT)
Dept: OUTPATIENT SERVICES | Facility: HOSPITAL | Age: 76
LOS: 1 days | End: 2021-05-21

## 2021-05-21 ENCOUNTER — APPOINTMENT (OUTPATIENT)
Dept: CARDIOLOGY | Facility: CLINIC | Age: 76
End: 2021-05-21
Payer: MEDICARE

## 2021-05-21 ENCOUNTER — NON-APPOINTMENT (OUTPATIENT)
Age: 76
End: 2021-05-21

## 2021-05-21 ENCOUNTER — APPOINTMENT (OUTPATIENT)
Dept: CV DIAGNOSITCS | Facility: HOSPITAL | Age: 76
End: 2021-05-21
Payer: MEDICARE

## 2021-05-21 VITALS
DIASTOLIC BLOOD PRESSURE: 97 MMHG | TEMPERATURE: 96.8 F | SYSTOLIC BLOOD PRESSURE: 138 MMHG | WEIGHT: 180 LBS | BODY MASS INDEX: 30.73 KG/M2 | HEART RATE: 65 BPM | HEIGHT: 64 IN | OXYGEN SATURATION: 93 %

## 2021-05-21 DIAGNOSIS — Z86.79 PERSONAL HISTORY OF OTHER DISEASES OF THE CIRCULATORY SYSTEM: ICD-10-CM

## 2021-05-21 DIAGNOSIS — I48.91 UNSPECIFIED ATRIAL FIBRILLATION: ICD-10-CM

## 2021-05-21 PROCEDURE — 99214 OFFICE O/P EST MOD 30 MIN: CPT

## 2021-05-21 PROCEDURE — 93000 ELECTROCARDIOGRAM COMPLETE: CPT

## 2021-05-21 PROCEDURE — 93306 TTE W/DOPPLER COMPLETE: CPT | Mod: 26

## 2021-05-21 PROCEDURE — 99072 ADDL SUPL MATRL&STAF TM PHE: CPT

## 2021-05-22 PROBLEM — Z86.79 HISTORY OF MITRAL VALVE INSUFFICIENCY: Status: RESOLVED | Noted: 2021-05-22 | Resolved: 2021-05-22

## 2021-05-22 RX ORDER — METHENAMINE HIPPURATE 1 G/1
1 TABLET ORAL
Qty: 180 | Refills: 0 | Status: ACTIVE | COMMUNITY
Start: 2021-03-04

## 2021-05-22 NOTE — HISTORY OF PRESENT ILLNESS
Please schedule patient at [facility] [FreeTextEntry1] : Ms. Gonsalez presents to the office today for a follow up visit.\par \par Ms. Gonsalez is a 75 year old female with a medical history significant for hypertension, hyperlipidemia, recently diagnosed atrial fibrillation, hypothyroidism, migraine headaches, colon polyps, and frequent urinary tract infections.  In addition, Ms. Gonsalez has previously undergone an appendectomy, repair of a deviated nasal septum, a right ovarian cystectomy, a pelvic sling procedure, and a tubal ligation.\par \par In November 2017, Ms. Gonsalez had experienced a brief episode of syncope while sitting in a chair.  Following that event, I had referred her for a transthoracic echocardiogram in addition to a Holter monitor.  In summary, the transthoracic echocardiogram demonstrated normal LV and RV systolic function, with no regional wall motion abnormalities.  There was mild-moderate mitral regurgitation as well as mild aortic regurgitation.  There was evidence of mild pulmonary hypertension, with an estimated pulmonary artery systolic pressure of 45 mmHg.  Ms. Gonsalez' Holter monitor demonstrated sinus rhythm with heart rates between 40/minute and 97/minute.  Rare premature ventricular complexes were noted.  In addition, there were occasional premature supraventricular complexes.  There were two very brief (3 beats) runs of SVT, with the fastest being at 138/minute.\par \par At the time of a previous office visit (May 25th, 2018), Ms. Gonsalez was found to have a resting heart rate of 42/minute on ECG.  In light of the fact that she had experienced recent episodes of lightheadedness, we had discontinued her metoprolol as this may have been contributing to the bradycardia.\par \par In spring 2018, Ms. Gonsalez has experienced bilateral bloody discharge from both breast nipples (left more than right) in addition to bilateral breast pain.  She underwent a mammogram on June 8th, 2018.  In summary, this demonstrated fibroglandular breasts with no radiographic signs of malignancy.  A follow up breast ultrasound (also performed on June 8th, 2018) demonstrated retroareolar ductal prominence but was otherwise unremarkable.  No masses were noted.  Ms. Gonsalez had a breast MRI performed on July 17th, 2018 for further evaluation and this demonstrated no evidence of breast malignancy.\par \par At the time of a previous office visit (July 13th, 2018), Ms. Gonsalez was found to be in atrial fibrillation with a somewhat rapid ventricular response (/minute).  This correlated with her recent symptoms of palpitations and a pounding sensation in her chest.  Because of the slightly rapid ventricular response to the atrial fibrillation, Ms. Gonsalez was started back on beta blocker therapy (with metoprolol tartrate 25 mg BID).  Ms. Gonsalez declined to be started on anticoagulation therapy and she was started on aspirin instead.   \par \par Ms. Gonsalez' most recent transthoracic echocardiogram was performed at [facility] on January 10th, 2020.  In summary, this demonstrated normal LV and RV systolic function, with no regional wall motion abnormalities (LVEF 60%).  There was mild to moderate mitral regurgitation and mild aortic regurgitation.\par \par Ms. Gonsalez reports that she has been feeling well since her last office visit.  She has been able to ambulate although this has been made somewhat more difficult due to back pain related to a possible herniated lumbar disc.  There has been no chest pain either at rest or with exertion.  Ms. Gonsalez experiences some dyspnea with brisk walking but has had no dyspnea at rest.  There has been no history of palpitations, presyncope, or syncope.  Ms. Gonsalez denies having any orthopnea, paroxysmal nocturnal dyspnea, or edema.  Of note is that Ms. Gonsalez had a colonoscopy performed approximately two weeks ago and reports that two polyps were resected.  She will have a follow up colonoscopy performed in five years.

## 2021-05-22 NOTE — CARDIOLOGY SUMMARY
[de-identified] : 5/21/2021:  Atrial fibrillation with an average ventricular response of 65/minute; probable arm lead reversal; nonspecific ST/T abnormalities.

## 2021-05-22 NOTE — PHYSICAL EXAM
[Normal Conjunctiva] : normal conjunctiva [No Xanthelasma] : no xanthelasma [Normal Venous Pressure] : normal venous pressure [Soft] : abdomen soft [Non Tender] : non-tender [Normal Bowel Sounds] : normal bowel sounds [No Edema] : no edema [No Cyanosis] : no cyanosis [No Clubbing] : no clubbing [No Rash] : no rash [Normal] : alert and oriented, normal memory

## 2021-05-22 NOTE — DISCUSSION/SUMMARY
[FreeTextEntry1] : Of note is that Ms. Gonsalez had a follow up transthoracic echocardiogram performed at the time of today's office visit.  In summary, this demonstrated normal LV and RV systolic function, with no regional wall motion abnormalities (LVEF 61%).  There was moderate mitral regurgitation.  The aortic valve was calcified and trileaflet with normal opening.  There was mild aortic regurgitation.  The left atrium was found to be mildly dilated.  Compared to Ms. Gonsalez' previous study of 1/10/2020, somewhat more mitral regurgitation was noted on today's study.\par \par In summary, Ms. Gonsalez appears to be doing reasonably well from a cardiac standpoint.  She has been able to ambulate and has not had any symptoms suggestive of angina or congestive heart failure.  In addition, she continues to remain minimally symptomatic from her atrial fibrillation, which appears well rate controlled on her current medical regimen.\par \par Ms. Gonsalez continues to decline considering therapeutic anticoagulation in the setting of chronic atrial fibrillation, for thromboembolic prophylaxis.  We again discussed the incrementally increased risk of stroke in the setting of atrial fibrillation without therapeutic anticoagulation.  Ms. Gonsalez has an elevated [id number] score of 4 (two points for age 75 years and one point each for hypertension and female gender) and, as such, anticoagulation is indicated.  However, Ms. Gonsalez continues to decline being started on anticoagulation therapy.\par \par We also discussed the findings of Ms. Gonsalez' follow up transthoracic echocardiogram.  I indicated that more mitral regurgitation was noted on the current study.  We will continue to monitor the degree of mitral regurgitation with serial echocardiograms.\par \par Ms. Gonsalez' diastolic blood pressure was elevated at today's office visit.  However, as it is ordinarily under good control, I did not make any changes to her antihypertensive regimen at this time.  Ms. Gonsalez will follow up with me in approximately four months.

## 2021-06-17 ENCOUNTER — RESULT REVIEW (OUTPATIENT)
Age: 76
End: 2021-06-17

## 2021-06-17 ENCOUNTER — APPOINTMENT (OUTPATIENT)
Dept: ULTRASOUND IMAGING | Facility: CLINIC | Age: 76
End: 2021-06-17
Payer: MEDICARE

## 2021-06-17 ENCOUNTER — APPOINTMENT (OUTPATIENT)
Dept: MAMMOGRAPHY | Facility: CLINIC | Age: 76
End: 2021-06-17
Payer: MEDICARE

## 2021-06-17 ENCOUNTER — OUTPATIENT (OUTPATIENT)
Dept: OUTPATIENT SERVICES | Facility: HOSPITAL | Age: 76
LOS: 1 days | End: 2021-06-17
Payer: MEDICARE

## 2021-06-17 DIAGNOSIS — Z00.8 ENCOUNTER FOR OTHER GENERAL EXAMINATION: ICD-10-CM

## 2021-06-17 DIAGNOSIS — R92.8 OTHER ABNORMAL AND INCONCLUSIVE FINDINGS ON DIAGNOSTIC IMAGING OF BREAST: ICD-10-CM

## 2021-06-17 PROCEDURE — 77063 BREAST TOMOSYNTHESIS BI: CPT

## 2021-06-17 PROCEDURE — 77063 BREAST TOMOSYNTHESIS BI: CPT | Mod: 26

## 2021-06-17 PROCEDURE — 77067 SCR MAMMO BI INCL CAD: CPT

## 2021-06-17 PROCEDURE — 77067 SCR MAMMO BI INCL CAD: CPT | Mod: 26

## 2021-06-17 PROCEDURE — 76641 ULTRASOUND BREAST COMPLETE: CPT | Mod: 26,50

## 2021-06-17 PROCEDURE — 76641 ULTRASOUND BREAST COMPLETE: CPT

## 2021-06-22 ENCOUNTER — APPOINTMENT (OUTPATIENT)
Dept: BREAST CENTER | Facility: CLINIC | Age: 76
End: 2021-06-22
Payer: MEDICARE

## 2021-06-22 VITALS
SYSTOLIC BLOOD PRESSURE: 117 MMHG | OXYGEN SATURATION: 96 % | DIASTOLIC BLOOD PRESSURE: 81 MMHG | HEART RATE: 78 BPM | WEIGHT: 180 LBS | HEIGHT: 64 IN | BODY MASS INDEX: 30.73 KG/M2 | TEMPERATURE: 97.8 F

## 2021-06-22 DIAGNOSIS — N64.52 NIPPLE DISCHARGE: ICD-10-CM

## 2021-06-22 DIAGNOSIS — Z12.39 ENCOUNTER FOR OTHER SCREENING FOR MALIGNANT NEOPLASM OF BREAST: ICD-10-CM

## 2021-06-22 PROCEDURE — 99072 ADDL SUPL MATRL&STAF TM PHE: CPT

## 2021-06-22 PROCEDURE — 99213 OFFICE O/P EST LOW 20 MIN: CPT

## 2021-06-22 NOTE — ASSESSMENT
[FreeTextEntry1] : 76 yo female presents for clinical evaluation secondary to history of spontaneous bloody nipple discharge (patient was on Estrace cream for chronic UTI , began taking 9/25/2017 until 5/2018).  Recent imaging and clinical breast exam is benign.  Recommendation for follow up in 1 year after annual screening mammogram and sonogram.\par 1.  Continue annual screening mammogram and sonogram due 6/2022\par 2. Follow up exam in 1 year 6/2022 for clinical breast exam

## 2021-06-22 NOTE — PHYSICAL EXAM
[Normocephalic] : normocephalic [Atraumatic] : atraumatic [EOMI] : extra ocular movement intact [PERRL] : pupils equal, round and reactive to light [Sclera nonicteric] : sclera nonicteric [Supple] : supple [No Supraclavicular Adenopathy] : no supraclavicular adenopathy [Examined in the supine and seated position] : examined in the supine and seated position [No dominant masses] : no dominant masses in right breast  [No dominant masses] : no dominant masses left breast [No Nipple Retraction] : no left nipple retraction [No Nipple Discharge] : no right nipple discharge [Breast Nipple Inversion] : nipples inverted [No Axillary Lymphadenopathy] : no left axillary lymphadenopathy [No Edema] : no edema [No Rashes] : no rashes [No Ulceration] : no ulceration [Breast Abnormal Secretion Bloody Fluid Right] : no bloody discharge [Breast Abnormal Secretion Bloody Fluid Left] : no bloody discharge [Breast Nipple Retraction] : nipples not retracted [Breast Nipple Flattening] : nipples not flattened [Breast Nipple Fissures] : nipples not fissured [Breast Abnormal Lactation (Galactorrhea)] : no galactorrhea [Breast Abnormal Secretion Serous Fluid] : no serous discharge [Breast Abnormal Secretion Opalescent Fluid] : no milky discharge [de-identified] : No supraclavicular or axillary adenopathy. No dominant masses, normal to palpation. Inverted nipple without discharge. No skin changes.\par  [de-identified] : No supraclavicular or axillary adenopathy. No dominant masses, normal to palpation. inverted nipple. No skin changes.\par

## 2021-06-22 NOTE — HISTORY OF PRESENT ILLNESS
[FreeTextEntry1] : Referring Physician:  Dr. Ebony You\par I had the pleasure of seeing PARRIS HUTSON  in the office today for a follow up breast exam s/p history with bloody nipple discharge.\par \par Parris is a dominik 76 yo female who is in overall good health.  She states she was put on Estrace cream for her chronic urinary tract infection in 2017.  She developed spontaneous bloody nipple discharge in May of 2018 and saw her urologist who then discontinued the Estrace cream and she has not had any problems since.  She underwent diagnostic mammogram/sonogram which both came back benign.  She is here today for clinical evaluation.\par \par She denies dominant breast mass, skin changes or nipple discharge. She denies headaches, blurry vision, chest pain, SOB, abdominal pain, joint aches or difficult walking.\par \par  with 1st delivery at 23.  Menses began at age 12.\par She denies personal history of malignancy.\par She denies family history.\par \par 2021  Digital bilateral screening mammogram with CAD and tomosynthesis and breast ultrasound\par Impression:  Benign findings.  No mammographic or ultrasonographic evidence of malignancy.  BIRADS 2 benign.\par \par We reviewed clinical exam and recent imaging.  Both clinical exam and recent imaging is benign today.  Recommendation to continue annual screening mammogram and sonogram in 2022 and follow up clinical breast exam in  after imaging.  She understands and agrees to plan.  All questions answered.

## 2021-06-24 ENCOUNTER — APPOINTMENT (OUTPATIENT)
Dept: SURGERY | Facility: CLINIC | Age: 76
End: 2021-06-24

## 2021-07-14 ENCOUNTER — RX RENEWAL (OUTPATIENT)
Age: 76
End: 2021-07-14

## 2021-08-27 RX ORDER — METOPROLOL TARTRATE 25 MG/1
25 TABLET, FILM COATED ORAL
Qty: 60 | Refills: 4 | Status: ACTIVE | COMMUNITY
Start: 2018-11-30 | End: 1900-01-01

## 2021-10-08 ENCOUNTER — APPOINTMENT (OUTPATIENT)
Dept: CARDIOLOGY | Facility: CLINIC | Age: 76
End: 2021-10-08
Payer: MEDICARE

## 2021-10-08 ENCOUNTER — NON-APPOINTMENT (OUTPATIENT)
Age: 76
End: 2021-10-08

## 2021-10-08 VITALS
HEART RATE: 68 BPM | DIASTOLIC BLOOD PRESSURE: 80 MMHG | SYSTOLIC BLOOD PRESSURE: 134 MMHG | TEMPERATURE: 97 F | HEIGHT: 64 IN | RESPIRATION RATE: 16 BRPM | OXYGEN SATURATION: 96 %

## 2021-10-08 PROCEDURE — 93000 ELECTROCARDIOGRAM COMPLETE: CPT

## 2021-10-08 PROCEDURE — 99214 OFFICE O/P EST MOD 30 MIN: CPT

## 2021-10-09 NOTE — CARDIOLOGY SUMMARY
[de-identified] : 10/8/2021:  Atrial fibrillation with an average ventricular response of 68/minute; nonspecific ST/T abnormalities.

## 2021-10-09 NOTE — HISTORY OF PRESENT ILLNESS
[FreeTextEntry1] : Ms. Gonsalez presents to the office today for a follow up visit.\par \par Ms. Gonsalez is a 75 year old female with a medical history significant for hypertension, hyperlipidemia, atrial fibrillation, hypothyroidism, migraine headaches, colon polyps, and frequent urinary tract infections.  In addition, Ms. Gonsalez has previously undergone an appendectomy, repair of a deviated nasal septum, a right ovarian cystectomy, a pelvic sling procedure, and a tubal ligation.\par \par In November 2017, Ms. Gonsalez had experienced a brief episode of syncope while sitting in a chair.  Following that event, I had referred her for a transthoracic echocardiogram in addition to a Holter monitor.  In summary, the transthoracic echocardiogram demonstrated normal LV and RV systolic function, with no regional wall motion abnormalities.  There was mild-moderate mitral regurgitation as well as mild aortic regurgitation.  There was evidence of mild pulmonary hypertension, with an estimated pulmonary artery systolic pressure of 45 mmHg.  Ms. Gonsalez' Holter monitor demonstrated sinus rhythm with heart rates between 40/minute and 97/minute.  Rare premature ventricular complexes were noted.  In addition, there were occasional premature supraventricular complexes.  There were two very brief (3 beats) runs of SVT, with the fastest being at 138/minute.\par \par At the time of a previous office visit (May 25th, 2018), Ms. Gonsalez was found to have a resting heart rate of 42/minute on ECG.  In light of the fact that she had experienced recent episodes of lightheadedness, we had discontinued her metoprolol as this may have been contributing to the bradycardia.\par \par In spring 2018, Ms. Gonsalez has experienced bilateral bloody discharge from both breast nipples (left more than right) in addition to bilateral breast pain.  She underwent a mammogram on June 8th, 2018.  In summary, this demonstrated fibroglandular breasts with no radiographic signs of malignancy.  A follow up breast ultrasound (also performed on June 8th, 2018) demonstrated retroareolar ductal prominence but was otherwise unremarkable.  No masses were noted.  Ms. Gonsalez had a breast MRI performed on July 17th, 2018 for further evaluation and this demonstrated no evidence of breast malignancy.\par \par At the time of a previous office visit (July 13th, 2018), Ms. Gonsalez was found to be in atrial fibrillation with a somewhat rapid ventricular response (/minute).  This correlated with her recent symptoms of palpitations and a pounding sensation in her chest.  Because of the slightly rapid ventricular response to the atrial fibrillation, Ms. Gonsalez was started back on beta blocker therapy (with metoprolol tartrate 25 mg BID).  Ms. Gonsalez declined to be started on anticoagulation therapy and she was started on aspirin instead.   \par \par Ms. Gonsaelz' most recent transthoracic echocardiogram was performed at [facility] on May 21st, 2021.  In summary, this demonstrated normal LV and RV systolic function, with no regional wall motion abnormalities (LVEF 61%).  There was moderate mitral regurgitation.  The aortic valve was calcified and trileaflet with normal opening.  There was mild aortic regurgitation.  The left atrium was found to be mildly dilated.  Compared to Ms. Gonsalez' previous study of 1/10/2020, somewhat more mitral regurgitation was noted on the current study.\par \par Ms. Gonsalez reports that she has been feeling generally well since her last office visit.  However, she did experience two episodes of chest discomfort, both of which occurred while she was in bed.  She describes the chest discomfort as a vague "ache" with radiation into the neck but not down the arms.  There was no associated nausea, dyspnea, or diaphoresis.  Ms. Gonsalez has not experienced any recent exertional dyspnea.  There has been no palpitations, presyncope, or syncope.  Ms. Gonsalez denies having any orthopnea, paroxysmal nocturnal dyspnea, or edema.

## 2021-10-09 NOTE — DISCUSSION/SUMMARY
[FreeTextEntry1] : In summary, Ms. Gonsalez experienced two recent episodes of chest discomfort of uncertain etiology while at rest.  Many aspects of her symptoms are somewhat atypical in nature.  Therefore, we will continue to observe Ms. Gonsalez conservatively for now.  However, if her chest discomfort persists, we will investigate this further.\par \par Today's 12-lead ECG demonstrates that Ms. Gonsalez continues to remain in atrial fibrillation.  The arrhythmia appears to be well rate controlled and minimally symptomatic.  After further discussion, Ms. Gonsalez is now amenable to beginning therapeutic anticoagulation for thromboembolic prophylaxis in the setting of chronic atrial fibrillation.  We will therefore initiate therapy with Xarelto (rivaroxaban) 20 mg/day.  Ms. Gonsalez will continue with the remainder of her medical therapy and will follow up with me in approximately 4-5 months.

## 2021-12-04 ENCOUNTER — RX RENEWAL (OUTPATIENT)
Age: 76
End: 2021-12-04

## 2022-04-08 ENCOUNTER — NON-APPOINTMENT (OUTPATIENT)
Age: 77
End: 2022-04-08

## 2022-04-08 ENCOUNTER — APPOINTMENT (OUTPATIENT)
Dept: CARDIOLOGY | Facility: CLINIC | Age: 77
End: 2022-04-08
Payer: MEDICARE

## 2022-04-08 VITALS
HEIGHT: 64 IN | SYSTOLIC BLOOD PRESSURE: 120 MMHG | HEART RATE: 70 BPM | TEMPERATURE: 97.9 F | DIASTOLIC BLOOD PRESSURE: 77 MMHG | BODY MASS INDEX: 30.9 KG/M2 | OXYGEN SATURATION: 97 %

## 2022-04-08 VITALS — BODY MASS INDEX: 30.9 KG/M2 | WEIGHT: 180 LBS

## 2022-04-08 PROCEDURE — 99214 OFFICE O/P EST MOD 30 MIN: CPT

## 2022-04-08 PROCEDURE — 93000 ELECTROCARDIOGRAM COMPLETE: CPT

## 2022-04-11 ENCOUNTER — RX RENEWAL (OUTPATIENT)
Age: 77
End: 2022-04-11

## 2022-04-14 RX ORDER — ROSUVASTATIN CALCIUM 5 MG/1
5 TABLET, FILM COATED ORAL DAILY
Refills: 0 | Status: ACTIVE | COMMUNITY

## 2022-04-14 NOTE — CARDIOLOGY SUMMARY
[de-identified] : 4/8/2022:  Atrial fibrillation with an average ventricular response of 66/minute; nonspecific ST/T abnormalities.

## 2022-04-14 NOTE — HISTORY OF PRESENT ILLNESS
[FreeTextEntry1] : Ms. Gonsalez presents to the office today for a follow up visit.\par \par Ms. Gonsalez is a 76 year old female with a medical history significant for hypertension, hyperlipidemia, atrial fibrillation, hypothyroidism, migraine headaches, colon polyps, and frequent urinary tract infections.  In addition, Ms. Gonsalez has previously undergone an appendectomy, repair of a deviated nasal septum, a right ovarian cystectomy, a pelvic sling procedure, and a tubal ligation.\par \par In November 2017, Ms. Gonsalez had experienced a brief episode of syncope while sitting in a chair.  Following that event, I had referred her for a transthoracic echocardiogram in addition to a Holter monitor.  In summary, the transthoracic echocardiogram demonstrated normal LV and RV systolic function, with no regional wall motion abnormalities.  There was mild-moderate mitral regurgitation as well as mild aortic regurgitation.  There was evidence of mild pulmonary hypertension, with an estimated pulmonary artery systolic pressure of 45 mmHg.  Ms. Gonsalez' Holter monitor demonstrated sinus rhythm with heart rates between 40/minute and 97/minute.  Rare premature ventricular complexes were noted.  In addition, there were occasional premature supraventricular complexes.  There were two very brief (3 beats) runs of SVT, with the fastest being at 138/minute.\par \par At the time of a previous office visit (May 25th, 2018), Ms. Gonsalez was found to have a resting heart rate of 42/minute on ECG.  In light of the fact that she had experienced recent episodes of lightheadedness, we had discontinued her metoprolol as this may have been contributing to the bradycardia.\par \par In spring 2018, Ms. Gonsalez has experienced bilateral bloody discharge from both breast nipples (left more than right) in addition to bilateral breast pain.  She underwent a mammogram on June 8th, 2018.  In summary, this demonstrated fibroglandular breasts with no radiographic signs of malignancy.  A follow up breast ultrasound (also performed on June 8th, 2018) demonstrated retroareolar ductal prominence but was otherwise unremarkable.  No masses were noted.  Ms. Gonsalez had a breast MRI performed on July 17th, 2018 for further evaluation and this demonstrated no evidence of breast malignancy.\par \par At the time of a previous office visit (July 13th, 2018), Ms. Gonsalez was found to be in atrial fibrillation with a somewhat rapid ventricular response (/minute).  This correlated with her recent symptoms of palpitations and a pounding sensation in her chest.  Because of the slightly rapid ventricular response to the atrial fibrillation, Ms. Gonsalez was started back on beta blocker therapy (with metoprolol tartrate 25 mg BID).  Ms. Gonsalez declined to be started on anticoagulation therapy and she was started on aspirin instead.   \par \par Ms. Gonsalez' most recent transthoracic echocardiogram was performed at [facility] on May 21st, 2021.  In summary, this demonstrated normal LV and RV systolic function, with no regional wall motion abnormalities (LVEF 61%).  There was moderate mitral regurgitation.  The aortic valve was calcified and trileaflet with normal opening.  There was mild aortic regurgitation.  The left atrium was found to be mildly dilated.  Compared to Ms. Gonsalez' previous study of 1/10/2020, somewhat more mitral regurgitation was noted on the current study.\par \par Ms. Gonsalez reports that she has been feeling generally well since her last office visit.  She has been able to ambulate without difficulty and denies having any chest pain or dyspnea either at rest or with exertion.  She does experience occasional palpitations but denies having any presyncope or syncope.  There has been no orthopnea, paroxysmal nocturnal dyspnea, or edema.  Of note is that Ms. Gonsalez' simvastatin was discontinued and she was started on rosuvastatin 5 mg/day in its place.

## 2022-04-14 NOTE — DISCUSSION/SUMMARY
[FreeTextEntry1] : In summary, Ms. Gonsalez appears to be doing well from a cardiac standpoint.  She has been able to ambulate without difficulty and denies having any symptoms suggestive of angina or congestive heart failure.  In addition, Ms. Gonsalez remains minimally symptomatic from her atrial fibrillation, which appears to be well rate controlled on her current medical regimen.  In addition, her blood pressure remains under excellent control on her current medical regimen.\par \par I encouraged Ms. Gonsalez to continue to exercise as tolerated.  I also advised her to continue with her current medical regimen, including Xarelto for thromboembolic prophylaxis in the setting of chronic atrial fibrillation.  She will return to the office in approximately six months, at which time we will obtain a follow up transthoracic echocardiogram in order to monitor the degree of mitral regurgitation.

## 2022-06-06 ENCOUNTER — APPOINTMENT (OUTPATIENT)
Dept: OBGYN | Facility: CLINIC | Age: 77
End: 2022-06-06

## 2022-06-06 VITALS
HEIGHT: 64 IN | WEIGHT: 180 LBS | SYSTOLIC BLOOD PRESSURE: 129 MMHG | BODY MASS INDEX: 30.73 KG/M2 | DIASTOLIC BLOOD PRESSURE: 90 MMHG

## 2022-06-20 ENCOUNTER — RESULT REVIEW (OUTPATIENT)
Age: 77
End: 2022-06-20

## 2022-06-20 ENCOUNTER — APPOINTMENT (OUTPATIENT)
Dept: MAMMOGRAPHY | Facility: CLINIC | Age: 77
End: 2022-06-20
Payer: MEDICARE

## 2022-06-20 ENCOUNTER — OUTPATIENT (OUTPATIENT)
Dept: OUTPATIENT SERVICES | Facility: HOSPITAL | Age: 77
LOS: 1 days | End: 2022-06-20
Payer: MEDICARE

## 2022-06-20 DIAGNOSIS — Z00.8 ENCOUNTER FOR OTHER GENERAL EXAMINATION: ICD-10-CM

## 2022-06-20 PROCEDURE — 77067 SCR MAMMO BI INCL CAD: CPT

## 2022-06-20 PROCEDURE — 76641 ULTRASOUND BREAST COMPLETE: CPT | Mod: 26,50

## 2022-06-20 PROCEDURE — 77063 BREAST TOMOSYNTHESIS BI: CPT | Mod: 26

## 2022-06-20 PROCEDURE — 77063 BREAST TOMOSYNTHESIS BI: CPT

## 2022-06-20 PROCEDURE — 76641 ULTRASOUND BREAST COMPLETE: CPT

## 2022-06-20 PROCEDURE — 77067 SCR MAMMO BI INCL CAD: CPT | Mod: 26

## 2022-07-11 ENCOUNTER — APPOINTMENT (OUTPATIENT)
Dept: OBGYN | Facility: CLINIC | Age: 77
End: 2022-07-11

## 2022-07-11 VITALS
BODY MASS INDEX: 32.1 KG/M2 | HEIGHT: 64 IN | SYSTOLIC BLOOD PRESSURE: 132 MMHG | WEIGHT: 188 LBS | DIASTOLIC BLOOD PRESSURE: 95 MMHG

## 2022-07-11 DIAGNOSIS — Z01.419 ENCOUNTER FOR GYNECOLOGICAL EXAMINATION (GENERAL) (ROUTINE) W/OUT ABNORMAL FINDINGS: ICD-10-CM

## 2022-07-11 PROCEDURE — 82270 OCCULT BLOOD FECES: CPT

## 2022-07-11 PROCEDURE — 99397 PER PM REEVAL EST PAT 65+ YR: CPT

## 2022-07-11 NOTE — PLAN
[FreeTextEntry1] : Semination and Pap smear was done.  Patient was given prescription for bone density.

## 2022-07-11 NOTE — HISTORY OF PRESENT ILLNESS
[FreeTextEntry1] : 76 year-old patient for gynecological exam without any complaints [Mammogramdate] : 2022 [PapSmeardate] : 2020 [ColonoscopyDate] : 2021 [Currently In Menopause] : currently in menopause [Menopause Age: ____] : age at menopause was [unfilled]

## 2022-07-13 LAB — HPV HIGH+LOW RISK DNA PNL CVX: NOT DETECTED

## 2022-07-18 LAB — CYTOLOGY CVX/VAG DOC THIN PREP: NORMAL

## 2022-07-19 ENCOUNTER — APPOINTMENT (OUTPATIENT)
Dept: RADIOLOGY | Facility: CLINIC | Age: 77
End: 2022-07-19

## 2022-07-19 ENCOUNTER — OUTPATIENT (OUTPATIENT)
Dept: OUTPATIENT SERVICES | Facility: HOSPITAL | Age: 77
LOS: 1 days | End: 2022-07-19
Payer: MEDICARE

## 2022-07-19 DIAGNOSIS — Z00.8 ENCOUNTER FOR OTHER GENERAL EXAMINATION: ICD-10-CM

## 2022-07-19 PROCEDURE — 77080 DXA BONE DENSITY AXIAL: CPT | Mod: 26

## 2022-07-19 PROCEDURE — 77080 DXA BONE DENSITY AXIAL: CPT

## 2022-07-22 ENCOUNTER — NON-APPOINTMENT (OUTPATIENT)
Age: 77
End: 2022-07-22

## 2022-10-07 ENCOUNTER — NON-APPOINTMENT (OUTPATIENT)
Age: 77
End: 2022-10-07

## 2022-10-07 ENCOUNTER — APPOINTMENT (OUTPATIENT)
Dept: CV DIAGNOSITCS | Facility: HOSPITAL | Age: 77
End: 2022-10-07

## 2022-10-07 ENCOUNTER — OUTPATIENT (OUTPATIENT)
Dept: OUTPATIENT SERVICES | Facility: HOSPITAL | Age: 77
LOS: 1 days | End: 2022-10-07

## 2022-10-07 ENCOUNTER — APPOINTMENT (OUTPATIENT)
Dept: CARDIOLOGY | Facility: CLINIC | Age: 77
End: 2022-10-07

## 2022-10-07 VITALS
OXYGEN SATURATION: 95 % | HEART RATE: 63 BPM | WEIGHT: 185 LBS | BODY MASS INDEX: 31.58 KG/M2 | HEIGHT: 64 IN | DIASTOLIC BLOOD PRESSURE: 91 MMHG | SYSTOLIC BLOOD PRESSURE: 131 MMHG

## 2022-10-07 DIAGNOSIS — I48.91 UNSPECIFIED ATRIAL FIBRILLATION: ICD-10-CM

## 2022-10-07 PROCEDURE — 99214 OFFICE O/P EST MOD 30 MIN: CPT | Mod: 25

## 2022-10-07 PROCEDURE — 93000 ELECTROCARDIOGRAM COMPLETE: CPT

## 2022-10-08 RX ORDER — AMLODIPINE BESYLATE 2.5 MG/1
2.5 TABLET ORAL
Qty: 30 | Refills: 4 | Status: DISCONTINUED | COMMUNITY
Start: 2018-09-18 | End: 2022-10-07

## 2022-10-08 NOTE — CARDIOLOGY SUMMARY
[de-identified] : 10/7/2022:  Atrial fibrillation with an average ventricular response of 75/minute; nonspecific ST/T abnormalities.

## 2022-10-08 NOTE — PHYSICAL EXAM
[Normal Conjunctiva] : normal conjunctiva [No Xanthelasma] : no xanthelasma [Normal Venous Pressure] : normal venous pressure [Normal] : clear lung fields, good air entry, no respiratory distress [Soft] : abdomen soft [Non Tender] : non-tender [Normal Bowel Sounds] : normal bowel sounds [Normal Gait] : normal gait [No Edema] : no edema [No Cyanosis] : no cyanosis [No Clubbing] : no clubbing [Moves all extremities] : moves all extremities [No Rash] : no rash [Alert and Oriented] : alert and oriented

## 2022-10-08 NOTE — HISTORY OF PRESENT ILLNESS
[FreeTextEntry1] : Ms. Gonsalez presents to the office today for a follow up visit.\par \par Ms. Gonsalez is a 76 year old female with a medical history significant for hypertension, hyperlipidemia, atrial fibrillation, hypothyroidism, migraine headaches, colon polyps, and frequent urinary tract infections.  In addition, Ms. Gonsalez has previously undergone an appendectomy, repair of a deviated nasal septum, a right ovarian cystectomy, a pelvic sling procedure, and a tubal ligation.\par \par In November 2017, Ms. Gonsalez had experienced a brief episode of syncope while sitting in a chair.  Following that event, I had referred her for a transthoracic echocardiogram in addition to a Holter monitor.  In summary, the transthoracic echocardiogram demonstrated normal LV and RV systolic function, with no regional wall motion abnormalities.  There was mild-moderate mitral regurgitation as well as mild aortic regurgitation.  There was evidence of mild pulmonary hypertension, with an estimated pulmonary artery systolic pressure of 45 mmHg.  Ms. Gonsalez' Holter monitor demonstrated sinus rhythm with heart rates between 40/minute and 97/minute.  Rare premature ventricular complexes were noted.  In addition, there were occasional premature supraventricular complexes.  There were two very brief (3 beats) runs of SVT, with the fastest being at 138/minute.\par \par At the time of a previous office visit (May 25th, 2018), Ms. Gonsalez was found to have a resting heart rate of 42/minute on ECG.  In light of the fact that she had experienced recent episodes of lightheadedness, we had discontinued her metoprolol as this may have been contributing to the bradycardia.\par \par In spring 2018, Ms. Gonsalez has experienced bilateral bloody discharge from both breast nipples (left more than right) in addition to bilateral breast pain.  She underwent a mammogram on June 8th, 2018.  In summary, this demonstrated fibroglandular breasts with no radiographic signs of malignancy.  A follow up breast ultrasound (also performed on June 8th, 2018) demonstrated retroareolar ductal prominence but was otherwise unremarkable.  No masses were noted.  Ms. Gonsalez had a breast MRI performed on July 17th, 2018 for further evaluation and this demonstrated no evidence of breast malignancy.\par \par At the time of a previous office visit (July 13th, 2018), Ms. Gonsalez was found to be in atrial fibrillation with a somewhat rapid ventricular response (/minute).  This correlated with her recent symptoms of palpitations and a pounding sensation in her chest.  Because of the slightly rapid ventricular response to the atrial fibrillation, Ms. Gonsalez was started back on beta blocker therapy (with metoprolol tartrate 25 mg BID).  Ms. Gonsalez declined to be started on anticoagulation therapy and she was started on aspirin instead.   \par \par Ms. Gonsalez' most recent transthoracic echocardiogram was performed at [facility] on May 21st, 2021.  In summary, this demonstrated normal LV and RV systolic function, with no regional wall motion abnormalities (LVEF 61%).  There was moderate mitral regurgitation.  The aortic valve was calcified and trileaflet with normal opening.  There was mild aortic regurgitation.  The left atrium was found to be mildly dilated.  Compared to Ms. Gonsalez' previous study of 1/10/2020, somewhat more mitral regurgitation was noted on the current study.\par \par Ms. Gonsalez reports that she has been feeling generally well since her last office visit.  She has been able to walk without difficulty or limitation and denies having any chest pain or dyspnea either at rest or with exertion.  Ms. Gonsalez denies having any palpitations, presyncope, or syncope.  There has been no orthopnea, paroxysmal nocturnal dyspnea, or edema.

## 2022-10-08 NOTE — DISCUSSION/SUMMARY
[EKG obtained to assist in diagnosis and management of assessed problem(s)] : EKG obtained to assist in diagnosis and management of assessed problem(s) [FreeTextEntry1] : Of note is that Ms. Gonsalez had a follow up transthoracic echocardiogram performed at the time of today's office visit.  In summary, this demonstrated normal LV and RV systolic function, with no regional wall motion abnormalities (LVEF 64%).  There was moderate mitral regurgitation noted.  The aortic valve was calcified and trileaflet with normal opening.  Mild aortic regurgitation was noted.  The left atrium was found to be moderately dilated.  There was mild pulmonary hypertension, with an estimated pulmonary artery systolic pressure of 46 mmHg.  In summary, there were no significant interval changes compared to Ms. Gonsalez' most recent echocardiogram of 5/21/2021.\par \par In summary, Ms. Gonsalez appears to be minimally symptomatic from a cardiac standpoint.  In particular, she continues to remain minimally symptomatic from her atrial fibrillation, which appears to be well rate controlled on her current medical regimen.  Ms. Gonsalez continues to take Xarelto for thromboembolic prophylaxis in the setting of chronic atrial fibrillation.\par \par I noted that Ms. Gonsalez' diastolic blood pressure continues to remain elevated.  I therefore suggested to her that we increase her amlodipine dose from 2.5 mg/day to 5 mg/day.  Ms. Gonsalez will be seeing her primary care physician in the near future, at which time her blood pressure will be measured again.  Ms. Gonsalez will follow up with me in approximately six months.

## 2022-10-10 ENCOUNTER — RX RENEWAL (OUTPATIENT)
Age: 77
End: 2022-10-10

## 2022-12-06 ENCOUNTER — APPOINTMENT (OUTPATIENT)
Dept: ULTRASOUND IMAGING | Facility: CLINIC | Age: 77
End: 2022-12-06

## 2023-02-23 ENCOUNTER — APPOINTMENT (OUTPATIENT)
Dept: ULTRASOUND IMAGING | Facility: CLINIC | Age: 78
End: 2023-02-23
Payer: MEDICARE

## 2023-02-23 PROCEDURE — 76770 US EXAM ABDO BACK WALL COMP: CPT

## 2023-04-10 ENCOUNTER — RX RENEWAL (OUTPATIENT)
Age: 78
End: 2023-04-10

## 2023-05-19 ENCOUNTER — NON-APPOINTMENT (OUTPATIENT)
Age: 78
End: 2023-05-19

## 2023-05-19 ENCOUNTER — APPOINTMENT (OUTPATIENT)
Dept: CARDIOLOGY | Facility: CLINIC | Age: 78
End: 2023-05-19
Payer: MEDICARE

## 2023-05-19 VITALS
HEART RATE: 73 BPM | DIASTOLIC BLOOD PRESSURE: 89 MMHG | BODY MASS INDEX: 30.73 KG/M2 | HEIGHT: 64 IN | OXYGEN SATURATION: 98 % | WEIGHT: 180 LBS | SYSTOLIC BLOOD PRESSURE: 135 MMHG

## 2023-05-19 DIAGNOSIS — Z63.4 DISAPPEARANCE AND DEATH OF FAMILY MEMBER: ICD-10-CM

## 2023-05-19 DIAGNOSIS — R00.1 BRADYCARDIA, UNSPECIFIED: ICD-10-CM

## 2023-05-19 DIAGNOSIS — U07.1 COVID-19: ICD-10-CM

## 2023-05-19 PROCEDURE — 93000 ELECTROCARDIOGRAM COMPLETE: CPT

## 2023-05-19 PROCEDURE — 99214 OFFICE O/P EST MOD 30 MIN: CPT | Mod: 25

## 2023-05-19 SDOH — SOCIAL STABILITY - SOCIAL INSECURITY: DISSAPEARANCE AND DEATH OF FAMILY MEMBER: Z63.4

## 2023-05-27 PROBLEM — U07.1 COVID-19 VIRUS INFECTION: Status: RESOLVED | Noted: 2023-05-27 | Resolved: 2023-05-27

## 2023-05-27 PROBLEM — R00.1 BRADYCARDIA: Status: ACTIVE | Noted: 2018-05-27

## 2023-05-27 PROBLEM — Z63.4 WIDOWED: Status: ACTIVE | Noted: 2023-05-27

## 2023-05-27 RX ORDER — ASCORBIC ACID 500 MG
500 TABLET ORAL
Refills: 0 | Status: ACTIVE | COMMUNITY

## 2023-05-27 NOTE — CARDIOLOGY SUMMARY
[de-identified] : 5/19/2023:  Atrial fibrillation with an average ventricular response of 73/minute; nonspecific ST/T abnormalities.

## 2023-05-27 NOTE — HISTORY OF PRESENT ILLNESS
[FreeTextEntry1] : Ms. Gonsalez presents to the office today for a follow up visit.\par \par Ms. Gonsalez is a 77 year old female with a medical history significant for hypertension, hyperlipidemia, atrial fibrillation, hypothyroidism, migraine headaches, colon polyps, and frequent urinary tract infections.  In addition, Ms. Gonsalez has previously undergone an appendectomy, repair of a deviated nasal septum, a right ovarian cystectomy, a pelvic sling procedure, and a tubal ligation.\par \par In November 2017, Ms. Gonsalez had experienced a brief episode of syncope while sitting in a chair.  Following that event, she was referred for a transthoracic echocardiogram in addition to a Holter monitor.  In summary, the transthoracic echocardiogram demonstrated normal LV and RV systolic function, with no regional wall motion abnormalities.  There was mild-moderate mitral regurgitation as well as mild aortic regurgitation.  There was evidence of mild pulmonary hypertension, with an estimated pulmonary artery systolic pressure of 45 mmHg.  Ms. Gonsalez' Holter monitor demonstrated sinus rhythm with heart rates between 40/minute and 97/minute.  Rare premature ventricular complexes were noted.  In addition, there were occasional premature supraventricular complexes.  There were two very brief (3 beats) runs of SVT, with the fastest being at 138/minute.\par \par At the time of a previous office visit (May 25th, 2018), Ms. Gonsalez was found to have a resting heart rate of 42/minute on ECG.  In light of the fact that she had experienced recent episodes of lightheadedness, we had discontinued her metoprolol as this may have been contributing to the bradycardia.\par \par In spring 2018, Ms. Gonsalez has experienced bilateral bloody discharge from both breast nipples (left more than right) in addition to bilateral breast pain.  She underwent a mammogram on June 8th, 2018.  In summary, this demonstrated fibroglandular breasts with no radiographic signs of malignancy.  A follow up breast ultrasound (also performed on June 8th, 2018) demonstrated retroareolar ductal prominence but was otherwise unremarkable.  No masses were noted.  Ms. Gonsalez had a breast MRI performed on July 17th, 2018 for further evaluation and this demonstrated no evidence of breast malignancy.\par \par At the time of a previous office visit (July 13th, 2018), Ms. Gonsalez was found to be in atrial fibrillation with a somewhat rapid ventricular response (/minute).  This correlated with her recent symptoms of palpitations and a pounding sensation in her chest.  Because of the slightly rapid ventricular response to the atrial fibrillation, Ms. Gonsalez was started back on beta blocker therapy (with metoprolol tartrate 25 mg BID).  Ms. Gonsalez declined to be started on anticoagulation therapy and she was initially started on aspirin instead.  However, since then she subsequently agreed to begin full therapeutic anticoagulation and has been treated with Xarelto for thromboembolic prophylaxis.\par \par Ms. Gonsalez' most recent transthoracic echocardiogram was performed at [facility] on October 7th, 2022.  In summary, this demonstrated normal LV and RV systolic function, with no regional wall motion abnormalities (LVEF 64%).  There was moderate mitral regurgitation noted.  The aortic valve was calcified and trileaflet with normal opening.  Mild aortic regurgitation was noted.  The left atrium was found to be moderately dilated.  There was mild pulmonary hypertension, with an estimated pulmonary artery systolic pressure of 46 mmHg.  In summary, there were no significant interval changes compared to Ms. Gonsalez' most recent echocardiogram of 5/21/2021.\par \par Ms. Gonsalez reports that her  passed away in January 2023 from complications related to a recent diagnosis of lymphoma, superimposed on multiple coexistent medical conditions.  As a result, she has been grieving and continues to grieve his loss.  Ms. Gonsalez has been able to ambulate without difficulty and denies having any chest pain or dyspnea either at rest or with exertion.  In addition, there has been no palpitations, presyncope, or syncope.  Ms. Gonsalez denies having any orthopnea, paroxysmal nocturnal dyspnea, or edema.  Of note is that Ms. Gonsalez does not measure her blood pressure at home.

## 2023-05-27 NOTE — DISCUSSION/SUMMARY
[EKG obtained to assist in diagnosis and management of assessed problem(s)] : EKG obtained to assist in diagnosis and management of assessed problem(s) [FreeTextEntry1] : In summary, Ms. Gonsalez appears to be doing reasonably well from a cardiac standpoint.  She continues to maintain a good functional capacity and has not had any recent symptoms suggestive of angina or congestive heart failure.  In addition, Ms. Gonsalez remains minimally symptomatic from her chronic atrial fibrillation, which appears well rate controlled on her current medical regimen.\par \par Ms. Gonsalez' blood pressure was mildly elevated at today's office visit.  However, she is reluctant to alter her antihypertensive regimen at this time.  Therefore, she will continue with her current medical regimen, including amlodipine, rosuvastatin, metoprolol tartrate, and Xarelto (for thromboembolic prophylaxis in the setting of chronic atrial fibrillation).  Ms. Gonsalez was also advised to begin measuring her blood pressure at home on a daily basis and to record a blood pressure diary.\par \par Ms. Gonsalez will follow up with me in approximately six months, at which time we will obtain a follow up transthoracic echocardiogram.

## 2023-07-07 ENCOUNTER — APPOINTMENT (OUTPATIENT)
Dept: MAMMOGRAPHY | Facility: CLINIC | Age: 78
End: 2023-07-07
Payer: MEDICARE

## 2023-07-07 ENCOUNTER — RESULT REVIEW (OUTPATIENT)
Age: 78
End: 2023-07-07

## 2023-07-07 ENCOUNTER — APPOINTMENT (OUTPATIENT)
Dept: ULTRASOUND IMAGING | Facility: CLINIC | Age: 78
End: 2023-07-07
Payer: MEDICARE

## 2023-07-07 PROCEDURE — 77067 SCR MAMMO BI INCL CAD: CPT

## 2023-07-07 PROCEDURE — 76641 ULTRASOUND BREAST COMPLETE: CPT | Mod: 50

## 2023-07-07 PROCEDURE — 77063 BREAST TOMOSYNTHESIS BI: CPT

## 2023-08-02 ENCOUNTER — APPOINTMENT (OUTPATIENT)
Dept: HEART FAILURE | Facility: CLINIC | Age: 78
End: 2023-08-02

## 2023-11-10 ENCOUNTER — NON-APPOINTMENT (OUTPATIENT)
Age: 78
End: 2023-11-10

## 2023-11-10 ENCOUNTER — APPOINTMENT (OUTPATIENT)
Dept: CV DIAGNOSITCS | Facility: HOSPITAL | Age: 78
End: 2023-11-10

## 2023-11-10 ENCOUNTER — OUTPATIENT (OUTPATIENT)
Dept: OUTPATIENT SERVICES | Facility: HOSPITAL | Age: 78
LOS: 1 days | End: 2023-11-10
Payer: MEDICARE

## 2023-11-10 ENCOUNTER — APPOINTMENT (OUTPATIENT)
Dept: CARDIOLOGY | Facility: CLINIC | Age: 78
End: 2023-11-10
Payer: MEDICARE

## 2023-11-10 VITALS
BODY MASS INDEX: 30.73 KG/M2 | WEIGHT: 180 LBS | HEIGHT: 64 IN | SYSTOLIC BLOOD PRESSURE: 110 MMHG | OXYGEN SATURATION: 96 % | DIASTOLIC BLOOD PRESSURE: 75 MMHG | HEART RATE: 74 BPM

## 2023-11-10 DIAGNOSIS — I48.91 UNSPECIFIED ATRIAL FIBRILLATION: ICD-10-CM

## 2023-11-10 DIAGNOSIS — I34.0 NONRHEUMATIC MITRAL (VALVE) INSUFFICIENCY: ICD-10-CM

## 2023-11-10 DIAGNOSIS — E78.5 HYPERLIPIDEMIA, UNSPECIFIED: ICD-10-CM

## 2023-11-10 DIAGNOSIS — I10 ESSENTIAL (PRIMARY) HYPERTENSION: ICD-10-CM

## 2023-11-10 PROCEDURE — 93306 TTE W/DOPPLER COMPLETE: CPT | Mod: 26

## 2023-11-10 PROCEDURE — 99214 OFFICE O/P EST MOD 30 MIN: CPT | Mod: 25

## 2023-11-10 PROCEDURE — 93000 ELECTROCARDIOGRAM COMPLETE: CPT

## 2023-11-12 PROBLEM — I34.0 MITRAL REGURGITATION: Status: ACTIVE | Noted: 2021-05-22

## 2023-11-12 PROBLEM — I10 HYPERTENSION: Status: ACTIVE | Noted: 2017-09-16

## 2023-11-12 PROBLEM — E78.5 HYPERLIPIDEMIA: Status: ACTIVE | Noted: 2017-09-16

## 2023-11-12 PROBLEM — I48.91 ATRIAL FIBRILLATION: Status: ACTIVE | Noted: 2018-07-19

## 2024-01-09 ENCOUNTER — NON-APPOINTMENT (OUTPATIENT)
Age: 79
End: 2024-01-09

## 2024-04-10 RX ORDER — AMLODIPINE BESYLATE 5 MG/1
5 TABLET ORAL DAILY
Qty: 90 | Refills: 3 | Status: ACTIVE | COMMUNITY
Start: 1900-01-01 | End: 1900-01-01

## 2024-04-10 RX ORDER — RIVAROXABAN 20 MG/1
20 TABLET, FILM COATED ORAL
Qty: 90 | Refills: 3 | Status: ACTIVE | COMMUNITY
Start: 2022-04-11 | End: 1900-01-01

## 2024-04-19 ENCOUNTER — APPOINTMENT (OUTPATIENT)
Dept: CARDIOLOGY | Facility: CLINIC | Age: 79
End: 2024-04-19

## 2024-07-08 ENCOUNTER — RESULT REVIEW (OUTPATIENT)
Age: 79
End: 2024-07-08

## 2024-07-08 ENCOUNTER — APPOINTMENT (OUTPATIENT)
Dept: ULTRASOUND IMAGING | Facility: CLINIC | Age: 79
End: 2024-07-08
Payer: MEDICARE

## 2024-07-08 ENCOUNTER — APPOINTMENT (OUTPATIENT)
Dept: MAMMOGRAPHY | Facility: CLINIC | Age: 79
End: 2024-07-08

## 2024-07-08 PROCEDURE — 77067 SCR MAMMO BI INCL CAD: CPT

## 2024-07-08 PROCEDURE — 76641 ULTRASOUND BREAST COMPLETE: CPT | Mod: 50

## 2024-07-08 PROCEDURE — 77063 BREAST TOMOSYNTHESIS BI: CPT

## 2024-07-15 ENCOUNTER — APPOINTMENT (OUTPATIENT)
Dept: OBGYN | Facility: CLINIC | Age: 79
End: 2024-07-15
Payer: MEDICARE

## 2024-07-15 VITALS
DIASTOLIC BLOOD PRESSURE: 87 MMHG | HEIGHT: 64 IN | SYSTOLIC BLOOD PRESSURE: 120 MMHG | BODY MASS INDEX: 30.73 KG/M2 | WEIGHT: 180 LBS

## 2024-07-15 DIAGNOSIS — Z01.419 ENCOUNTER FOR GYNECOLOGICAL EXAMINATION (GENERAL) (ROUTINE) W/OUT ABNORMAL FINDINGS: ICD-10-CM

## 2024-07-15 PROCEDURE — G0101: CPT | Mod: GA

## 2024-07-18 LAB
CYTOLOGY CVX/VAG DOC THIN PREP: NORMAL
HPV HIGH+LOW RISK DNA PNL CVX: NOT DETECTED

## 2024-07-22 ENCOUNTER — NON-APPOINTMENT (OUTPATIENT)
Age: 79
End: 2024-07-22

## 2024-07-22 ENCOUNTER — APPOINTMENT (OUTPATIENT)
Dept: RADIOLOGY | Facility: CLINIC | Age: 79
End: 2024-07-22
Payer: MEDICARE

## 2024-07-22 PROCEDURE — 77080 DXA BONE DENSITY AXIAL: CPT

## 2024-08-02 ENCOUNTER — NON-APPOINTMENT (OUTPATIENT)
Age: 79
End: 2024-08-02

## 2024-08-02 ENCOUNTER — APPOINTMENT (OUTPATIENT)
Dept: CARDIOLOGY | Facility: CLINIC | Age: 79
End: 2024-08-02
Payer: MEDICARE

## 2024-08-02 VITALS
SYSTOLIC BLOOD PRESSURE: 120 MMHG | DIASTOLIC BLOOD PRESSURE: 78 MMHG | HEIGHT: 64 IN | HEART RATE: 74 BPM | WEIGHT: 175 LBS | OXYGEN SATURATION: 94 % | BODY MASS INDEX: 29.88 KG/M2

## 2024-08-02 DIAGNOSIS — I10 ESSENTIAL (PRIMARY) HYPERTENSION: ICD-10-CM

## 2024-08-02 DIAGNOSIS — I42.9 CARDIOMYOPATHY, UNSPECIFIED: ICD-10-CM

## 2024-08-02 DIAGNOSIS — I48.91 UNSPECIFIED ATRIAL FIBRILLATION: ICD-10-CM

## 2024-08-02 DIAGNOSIS — R00.1 BRADYCARDIA, UNSPECIFIED: ICD-10-CM

## 2024-08-02 DIAGNOSIS — I34.0 NONRHEUMATIC MITRAL (VALVE) INSUFFICIENCY: ICD-10-CM

## 2024-08-02 DIAGNOSIS — E78.5 HYPERLIPIDEMIA, UNSPECIFIED: ICD-10-CM

## 2024-08-02 PROCEDURE — G2211 COMPLEX E/M VISIT ADD ON: CPT

## 2024-08-02 PROCEDURE — 93000 ELECTROCARDIOGRAM COMPLETE: CPT

## 2024-08-02 PROCEDURE — 99214 OFFICE O/P EST MOD 30 MIN: CPT

## 2024-08-03 PROBLEM — I42.9 CARDIOMYOPATHY, UNSPECIFIED TYPE: Status: ACTIVE | Noted: 2024-08-03

## 2024-08-03 NOTE — CARDIOLOGY SUMMARY
[de-identified] : 8/2/2024:  Atrial fibrillation with an average ventricular response of 69/minute; nonspecific ST/T abnormalities.

## 2024-08-03 NOTE — PHYSICAL EXAM
[Normal Conjunctiva] : normal conjunctiva [No Xanthelasma] : no xanthelasma [Soft] : abdomen soft [Non Tender] : non-tender [Normal Bowel Sounds] : normal bowel sounds [Normal] : normal gait [No Edema] : no edema [No Cyanosis] : no cyanosis [No Clubbing] : no clubbing [No Rash] : no rash [Moves all extremities] : moves all extremities [Alert and Oriented] : alert and oriented

## 2024-08-03 NOTE — HISTORY OF PRESENT ILLNESS
[FreeTextEntry1] : Ms. Gonsalez presents to the office today for a follow up visit.  Ms. Gonsalez is a 78 year old female with a medical history significant for hypertension, hyperlipidemia, atrial fibrillation, hypothyroidism, migraine headaches, colon polyps, and frequent urinary tract infections.  In addition, Ms. Gonsalez has previously undergone an appendectomy, repair of a deviated nasal septum, a right ovarian cystectomy, a pelvic sling procedure, and a tubal ligation.  In November 2017, Ms. Gonsalez had experienced a brief episode of syncope while sitting in a chair.  Following that event, she was referred for a transthoracic echocardiogram in addition to a Holter monitor.  In summary, the transthoracic echocardiogram demonstrated normal LV and RV systolic function, with no regional wall motion abnormalities.  There was mild-moderate mitral regurgitation as well as mild aortic regurgitation.  There was evidence of mild pulmonary hypertension, with an estimated pulmonary artery systolic pressure of 45 mmHg.  Ms. Gonsalez' Holter monitor demonstrated sinus rhythm with heart rates between 40/minute and 97/minute.  Rare premature ventricular complexes were noted.  In addition, there were occasional premature supraventricular complexes.  There were two very brief (3 beats) runs of SVT, with the fastest being at 138/minute.  At the time of a previous office visit (May 25th, 2018), Ms. Gonsalez was found to have a resting heart rate of 42/minute on ECG.  In light of the fact that she had experienced recent episodes of lightheadedness, we had discontinued her metoprolol as this may have been contributing to the bradycardia.  In spring 2018, Ms. Gonsalez has experienced bilateral bloody discharge from both breast nipples (left more than right) in addition to bilateral breast pain.  She underwent a mammogram on June 8th, 2018.  In summary, this demonstrated fibroglandular breasts with no radiographic signs of malignancy.  A follow up breast ultrasound (also performed on June 8th, 2018) demonstrated retroareolar ductal prominence but was otherwise unremarkable.  No masses were noted.  Ms. Gonsalez had a breast MRI performed on July 17th, 2018 for further evaluation and this demonstrated no evidence of breast malignancy.  At the time of a previous office visit (July 13th, 2018), Ms. Gonsalez was found to be in atrial fibrillation with a somewhat rapid ventricular response (/minute).  This correlated with her recent symptoms of palpitations and a pounding sensation in her chest.  Because of the slightly rapid ventricular response to the atrial fibrillation, Ms. Gonsalez was started back on beta blocker therapy (with metoprolol tartrate 25 mg BID).  Ms. Gonsalez declined to be started on anticoagulation therapy and she was initially started on aspirin instead.  However, since then she subsequently agreed to begin full therapeutic anticoagulation and has been treated with Xarelto for thromboembolic prophylaxis.  Ms. Gonsalez' most recent transthoracic echocardiogram was performed at [facility] on October 7th, 2022.  In summary, this demonstrated normal LV and RV systolic function, with no regional wall motion abnormalities (LVEF 64%).  There was moderate mitral regurgitation noted.  The aortic valve was calcified and trileaflet with normal opening.  Mild aortic regurgitation was noted.  The left atrium was found to be moderately dilated.  There was mild pulmonary hypertension, with an estimated pulmonary artery systolic pressure of 46 mmHg.  In summary, there were no significant interval changes compared to Ms. Gonsalez' most recent echocardiogram of 5/21/2021.  Of note is that Ms. Gosnalez was recently seen at an outside Cardiology office (in June 2024).  She had presented there as she wasn't certain if she could continue to drive to return to this office.  Ms. Gonsalez had a transthoracic echocardiogram performed at the outside office on June 13th, 2024.  In summary, this was a technically difficult study (intravenous ultrasound enhancing agent was not administered).  There was mild global left ventricular systolic dysfunction (estimated LVEF 40-45%).  The right ventricle was mildly dilated with normal RV systolic function.  Moderate mitral regurgitation was noted.  Both atria were found to be dilated.  The estimated pulmonary artery systolic pressure is 35 mmHg.  Colour Doppler interrogation was suggestive of the presence of a patent foramen ovale (PFO).  Ms. Gonsalez reports that she has been under considerable stress recently, and that this may have had an impact on her symptoms.  Ms. Gonsalez is able to walk but does experience some dyspnea with ambulation.  There has been no dyspnea at rest.  Ms. Gonsalez denies having any chest pain either at rest or with exertion.  Ms. Gonsalez denies having any palpitations, presyncope, or syncope.  There has been no orthopnea or paroxysmal nocturnal dyspnea.  However, Ms. Gonsalez does note some mild lower extremity edema.

## 2024-08-03 NOTE — DISCUSSION/SUMMARY
[EKG obtained to assist in diagnosis and management of assessed problem(s)] : EKG obtained to assist in diagnosis and management of assessed problem(s) [FreeTextEntry1] : In summary, Ms. Gonsalez had a recent transthoracic echocardiogram (performed at an outside office) that demonstrated mild to moderate left ventricular systolic dysfunction (estimated LVEF 40-45%).  This would represent a change since her last echocardiogram (10/7/2022 at [facility]), which demonstrated normal left ventricular systolic function.  The etiology of the decline in left ventricular systolic function is currently uncertain.  Possible underlying causes may include factors such as poor blood pressure control (which appears to be unlikely), a sustained rapid ventricular response to the chronic atrial fibrillation, progression of valvular heart disease, and/or myocardial ischemia, among other possible causes.  Ms. Gonsalez is currently declining to alter her medical regimen such that her amlodipine would be replaced by an ACE inhibitor or ARB, in the setting of new LV systolic dysfunction.  Therefore, Ms. Gonsalez will continue with her current medical regimen, including Xarelto for thromboembolic prophylaxis in the setting of chronic atrial fibrillation.    Ms. Gonsalez will return to the office in approximately six months (or sooner as necessary), at which time a follow up transthoracic echocardiogram will be performed.

## 2024-08-12 ENCOUNTER — APPOINTMENT (OUTPATIENT)
Dept: OBGYN | Facility: CLINIC | Age: 79
End: 2024-08-12
Payer: MEDICARE

## 2024-08-12 VITALS
SYSTOLIC BLOOD PRESSURE: 124 MMHG | HEIGHT: 64 IN | DIASTOLIC BLOOD PRESSURE: 84 MMHG | BODY MASS INDEX: 29.88 KG/M2 | WEIGHT: 175 LBS

## 2024-08-12 DIAGNOSIS — N94.818 OTHER VULVODYNIA: ICD-10-CM

## 2024-08-12 DIAGNOSIS — L29.2 PRURITUS VULVAE: ICD-10-CM

## 2024-08-12 PROCEDURE — 99213 OFFICE O/P EST LOW 20 MIN: CPT

## 2024-08-12 RX ORDER — CLOTRIMAZOLE AND BETAMETHASONE DIPROPIONATE 10; .5 MG/G; MG/G
1-0.05 CREAM TOPICAL
Qty: 1 | Refills: 2 | Status: ACTIVE | COMMUNITY
Start: 2024-08-12 | End: 1900-01-01

## 2024-08-12 NOTE — HISTORY OF PRESENT ILLNESS
[Chronic] : a chronic [Itching] : itching [Rt Vulva] : right vulva [___ Months] : started [unfilled] months ago [Currently In Menopause] : currently in menopause [Menopause Age: ____] : age at menopause was [unfilled]

## 2024-08-19 ENCOUNTER — APPOINTMENT (OUTPATIENT)
Dept: OBGYN | Facility: CLINIC | Age: 79
End: 2024-08-19

## 2025-01-17 ENCOUNTER — APPOINTMENT (OUTPATIENT)
Dept: CV DIAGNOSITCS | Facility: HOSPITAL | Age: 80
End: 2025-01-17

## 2025-01-17 ENCOUNTER — NON-APPOINTMENT (OUTPATIENT)
Age: 80
End: 2025-01-17

## 2025-01-17 ENCOUNTER — RESULT REVIEW (OUTPATIENT)
Age: 80
End: 2025-01-17

## 2025-01-17 ENCOUNTER — OUTPATIENT (OUTPATIENT)
Dept: OUTPATIENT SERVICES | Facility: HOSPITAL | Age: 80
LOS: 1 days | End: 2025-01-17
Payer: MEDICARE

## 2025-01-17 ENCOUNTER — APPOINTMENT (OUTPATIENT)
Dept: CARDIOLOGY | Facility: CLINIC | Age: 80
End: 2025-01-17
Payer: MEDICARE

## 2025-01-17 VITALS
SYSTOLIC BLOOD PRESSURE: 116 MMHG | HEART RATE: 84 BPM | WEIGHT: 175 LBS | HEIGHT: 64 IN | TEMPERATURE: 97.8 F | RESPIRATION RATE: 16 BRPM | BODY MASS INDEX: 29.88 KG/M2 | DIASTOLIC BLOOD PRESSURE: 74 MMHG | OXYGEN SATURATION: 94 %

## 2025-01-17 DIAGNOSIS — I34.0 NONRHEUMATIC MITRAL (VALVE) INSUFFICIENCY: ICD-10-CM

## 2025-01-17 DIAGNOSIS — E78.5 HYPERLIPIDEMIA, UNSPECIFIED: ICD-10-CM

## 2025-01-17 DIAGNOSIS — I48.91 UNSPECIFIED ATRIAL FIBRILLATION: ICD-10-CM

## 2025-01-17 DIAGNOSIS — I10 ESSENTIAL (PRIMARY) HYPERTENSION: ICD-10-CM

## 2025-01-17 DIAGNOSIS — I42.9 CARDIOMYOPATHY, UNSPECIFIED: ICD-10-CM

## 2025-01-17 PROCEDURE — 93306 TTE W/DOPPLER COMPLETE: CPT | Mod: 26

## 2025-01-17 PROCEDURE — 93000 ELECTROCARDIOGRAM COMPLETE: CPT

## 2025-01-17 PROCEDURE — G2211 COMPLEX E/M VISIT ADD ON: CPT

## 2025-01-17 PROCEDURE — 99214 OFFICE O/P EST MOD 30 MIN: CPT

## 2025-02-20 ENCOUNTER — NON-APPOINTMENT (OUTPATIENT)
Age: 80
End: 2025-02-20

## 2025-07-18 ENCOUNTER — APPOINTMENT (OUTPATIENT)
Dept: CARDIOLOGY | Facility: CLINIC | Age: 80
End: 2025-07-18

## 2025-07-18 ENCOUNTER — NON-APPOINTMENT (OUTPATIENT)
Age: 80
End: 2025-07-18

## 2025-07-18 VITALS
HEIGHT: 64 IN | SYSTOLIC BLOOD PRESSURE: 132 MMHG | HEART RATE: 72 BPM | BODY MASS INDEX: 29.71 KG/M2 | OXYGEN SATURATION: 95 % | WEIGHT: 174 LBS | DIASTOLIC BLOOD PRESSURE: 86 MMHG

## 2025-07-18 VITALS — SYSTOLIC BLOOD PRESSURE: 122 MMHG | DIASTOLIC BLOOD PRESSURE: 71 MMHG

## 2025-07-18 PROCEDURE — 99214 OFFICE O/P EST MOD 30 MIN: CPT

## 2025-07-18 PROCEDURE — 93000 ELECTROCARDIOGRAM COMPLETE: CPT

## 2025-07-18 PROCEDURE — G2211 COMPLEX E/M VISIT ADD ON: CPT

## 2025-07-21 ENCOUNTER — APPOINTMENT (OUTPATIENT)
Dept: ULTRASOUND IMAGING | Facility: CLINIC | Age: 80
End: 2025-07-21
Payer: MEDICARE

## 2025-07-21 ENCOUNTER — RESULT REVIEW (OUTPATIENT)
Age: 80
End: 2025-07-21

## 2025-07-21 ENCOUNTER — APPOINTMENT (OUTPATIENT)
Dept: MAMMOGRAPHY | Facility: CLINIC | Age: 80
End: 2025-07-21
Payer: MEDICARE

## 2025-07-21 PROCEDURE — 76641 ULTRASOUND BREAST COMPLETE: CPT | Mod: 50

## 2025-07-21 PROCEDURE — 77063 BREAST TOMOSYNTHESIS BI: CPT

## 2025-07-21 PROCEDURE — 77067 SCR MAMMO BI INCL CAD: CPT
